# Patient Record
Sex: FEMALE | Race: WHITE | NOT HISPANIC OR LATINO | URBAN - METROPOLITAN AREA
[De-identification: names, ages, dates, MRNs, and addresses within clinical notes are randomized per-mention and may not be internally consistent; named-entity substitution may affect disease eponyms.]

---

## 2019-03-01 LAB
EXTERNAL HIV SCREEN: NORMAL
HCV AB SER-ACNC: NEGATIVE

## 2021-04-08 ENCOUNTER — OFFICE VISIT (OUTPATIENT)
Dept: URGENT CARE | Facility: CLINIC | Age: 26
End: 2021-04-08

## 2021-04-08 VITALS
HEART RATE: 72 BPM | WEIGHT: 155 LBS | DIASTOLIC BLOOD PRESSURE: 58 MMHG | BODY MASS INDEX: 26.46 KG/M2 | RESPIRATION RATE: 18 BRPM | OXYGEN SATURATION: 98 % | HEIGHT: 64 IN | TEMPERATURE: 97.6 F | SYSTOLIC BLOOD PRESSURE: 112 MMHG

## 2021-04-08 DIAGNOSIS — L02.91 ABSCESS: Primary | ICD-10-CM

## 2021-04-08 DIAGNOSIS — L03.213 PERIORBITAL CELLULITIS OF RIGHT EYE: ICD-10-CM

## 2021-04-08 PROCEDURE — G0382 LEV 3 HOSP TYPE B ED VISIT: HCPCS | Performed by: NURSE PRACTITIONER

## 2021-04-08 RX ORDER — SULFAMETHOXAZOLE AND TRIMETHOPRIM 800; 160 MG/1; MG/1
1 TABLET ORAL EVERY 12 HOURS SCHEDULED
Qty: 14 TABLET | Refills: 0 | Status: SHIPPED | OUTPATIENT
Start: 2021-04-08 | End: 2021-04-15

## 2021-04-08 NOTE — PATIENT INSTRUCTIONS
Abscess   AMBULATORY CARE:   An abscess  is an area under the skin where pus (infected fluid) collects  An abscess is often caused by bacteria, fungi or other germs that get into an open wound  You can get an abscess anywhere on your body  Common signs and symptoms of an abscess: You may have a swollen mass that is red and painful  Pus may leak out of the mass  The pus will be white or yellow and may smell bad  You may have redness and pain days before the mass appears  You may have a fever and chills if the infection spreads  Seek immediate care if:   · The area around your abscess becomes very painful, warm, or has red streaks  · You have a fever and chills  · Your heart is beating faster than usual      · You feel faint or confused  Contact your healthcare provider if:   · Your abscess gets bigger or does not get better  · Your abscess returns  · You have questions or concerns about your condition or care  Treatment for an abscess: Your healthcare provider may need to make a cut in the abscess to allow the pus to drain  You may need surgery to remove your abscess  You may  need any of the following:  · Antibiotics  help treat a bacterial infection  · Acetaminophen  decreases pain and fever  It is available without a doctor's order  Ask how much to take and how often to take it  Follow directions  Acetaminophen can cause liver damage if not taken correctly  · NSAIDs , such as ibuprofen, help decrease swelling, pain, and fever  This medicine is available with or without a doctor's order  NSAIDs can cause stomach bleeding or kidney problems in certain people  If you take blood thinner medicine, always ask your healthcare provider if NSAIDs are safe for you  Always read the medicine label and follow directions  · Take your medicine as directed  Contact your healthcare provider if you think your medicine is not helping or if you have side effects   Tell him or her if you are allergic to any medicine  Keep a list of the medicines, vitamins, and herbs you take  Include the amounts, and when and why you take them  Bring the list or the pill bottles to follow-up visits  Carry your medicine list with you in case of an emergency  Self-care:   · Apply a warm compress to your abscess  This will help it open and drain  Wet a washcloth in warm, but not hot, water  Apply the compress for 10 minutes  Repeat this 4 times each day  Do not  press on an abscess or try to open it with a needle  You may push the bacteria deeper or into your blood  · Do not share your clothes, towels, or sheets with anyone  This can spread the infection to others  · Wash your hands often  This can help prevent the spread of germs  Use soap and water or an alcohol-based hand rub  Care for your wound after it is drained:   · Care for your wound as directed  If your healthcare provider says it is okay, carefully remove the bandage and gauze packing  You may need to soak the gauze to get it out of your wound  Clean your wound and the area around it as directed  Dry the area and put on new, clean bandages  Change your bandages when they get wet or dirty  · Ask your healthcare provider how to change the gauze in your wound  Keep track of how many pieces of gauze are placed inside the wound  Do not put too much packing in the wound  Do not pack the gauze too tightly in your wound  Follow up with your healthcare provider in 1 to 3 days: You may need to have your packing removed or your bandage changed  Write down your questions so you remember to ask them during your visits  © Copyright [address] Information is for End User's use only and may not be sold, redistributed or otherwise used for commercial purposes  All illustrations and images included in CareNotes® are the copyrighted property of [employer]  or [facility] Dillan Parker   The above information is an  only   It is not intended as medical advice for individual conditions or treatments  Talk to your doctor, nurse or pharmacist before following any medical regimen to see if it is safe and effective for you

## 2021-04-08 NOTE — PROGRESS NOTES
[facility]        NAME: Sophie Marcum is a 31 y o  female  : 1995    MRN: [id number]  DATE: 2021  TIME: 6:15 PM    Assessment and Plan   Abscess [L02 91]  1  Abscess  sulfamethoxazole-trimethoprim (BACTRIM DS) 800-160 mg per tablet   2  Periorbital cellulitis of right eye  sulfamethoxazole-trimethoprim (BACTRIM DS) 800-160 mg per tablet     Start course of bactrim   rec warm compresses  Keep area clean and dry   rec topical antibiotic ointment also  Patient Instructions     Follow up with PCP in 3-5 days  Proceed to  ER if symptoms worsen  Chief Complaint     Chief Complaint   Patient presents with    Facial Pain     Pt reports having a pimple on her right forehead that she popped a few days ago  Pt states today she woke up with swelling and pressure around the lesion radiating through to her nasal area  History of Present Illness   Sophie Marcum presents to the clinic c/o    Pt reports having a pimple on her right forehead that she popped a few days ago  Pt states today she woke up with swelling and pressure around the lesion radiating through to her nasal area  Review of Systems   Review of Systems   All other systems reviewed and are negative  Current Medications     No long-term medications on file  Current Allergies     Allergies as of 2021    (No Known Allergies)            The following portions of the patient's history were reviewed and updated as appropriate: allergies, current medications, past family history, past medical history, past social history, past surgical history and problem list     Objective   /58   Pulse 72   Temp 97 6 °F (36 4 °C) (Tympanic)   Resp 18   Ht 5' 4" (1 626 m)   Wt 70 3 kg (155 lb)   LMP 2021 (Approximate)   SpO2 98%   BMI 26 61 kg/m²        Physical Exam     Physical Exam  Vitals signs and nursing note reviewed  Constitutional:       Appearance: She is well-developed     HENT:      Head: Normocephalic and atraumatic  Right periorbital erythema present  No raccoon eyes, Rhodes's sign, abrasion, contusion, masses, left periorbital erythema or laceration  Hair is normal      Eyes:      General: Lids are normal       Conjunctiva/sclera: Conjunctivae normal    Cardiovascular:      Rate and Rhythm: Normal rate and regular rhythm  Heart sounds: Normal heart sounds, S1 normal and S2 normal    Pulmonary:      Effort: Pulmonary effort is normal       Breath sounds: Normal breath sounds  Skin:     General: Skin is warm and dry  Neurological:      Mental Status: She is alert and oriented to person, place, and time  Psychiatric:         Speech: Speech normal          Behavior: Behavior normal  Behavior is cooperative  Thought Content:  Thought content normal          Judgment: Judgment normal

## 2022-07-20 ENCOUNTER — OFFICE VISIT (OUTPATIENT)
Dept: OBGYN CLINIC | Facility: CLINIC | Age: 27
End: 2022-07-20
Payer: COMMERCIAL

## 2022-07-20 VITALS
HEIGHT: 64 IN | SYSTOLIC BLOOD PRESSURE: 116 MMHG | BODY MASS INDEX: 27.45 KG/M2 | DIASTOLIC BLOOD PRESSURE: 76 MMHG | WEIGHT: 160.8 LBS

## 2022-07-20 DIAGNOSIS — Z72.53 HIGH RISK BISEXUAL BEHAVIOR: ICD-10-CM

## 2022-07-20 DIAGNOSIS — R10.2 PELVIC PAIN: Primary | ICD-10-CM

## 2022-07-20 PROBLEM — F31.9 BIPOLAR DISORDER (HCC): Status: ACTIVE | Noted: 2021-09-03

## 2022-07-20 PROBLEM — F10.20 ALCOHOL USE DISORDER, SEVERE, DEPENDENCE (HCC): Status: ACTIVE | Noted: 2021-09-03

## 2022-07-20 PROBLEM — F32.9 MAJOR DEPRESSIVE DISORDER: Status: ACTIVE | Noted: 2021-09-04

## 2022-07-20 PROCEDURE — 87591 N.GONORRHOEAE DNA AMP PROB: CPT | Performed by: OBSTETRICS & GYNECOLOGY

## 2022-07-20 PROCEDURE — 87660 TRICHOMONAS VAGIN DIR PROBE: CPT | Performed by: OBSTETRICS & GYNECOLOGY

## 2022-07-20 PROCEDURE — 87510 GARDNER VAG DNA DIR PROBE: CPT | Performed by: OBSTETRICS & GYNECOLOGY

## 2022-07-20 PROCEDURE — 99203 OFFICE O/P NEW LOW 30 MIN: CPT | Performed by: OBSTETRICS & GYNECOLOGY

## 2022-07-20 PROCEDURE — 87491 CHLMYD TRACH DNA AMP PROBE: CPT | Performed by: OBSTETRICS & GYNECOLOGY

## 2022-07-20 PROCEDURE — 87480 CANDIDA DNA DIR PROBE: CPT | Performed by: OBSTETRICS & GYNECOLOGY

## 2022-07-20 NOTE — PROGRESS NOTES
Patient is a 28 y o  No obstetric history on file  with Patient's last menstrual period was 07/12/2022 (exact date)  who presents requesting evaluation of a chronic issue she has never had evaluated  She reports she was last seen by a gynecologist greater than 10 years ago  She is unsure if her problem is GI or  in nature  Pt reports that she intermittently develops lower abdominal pain that radiates to her rectum and it "feels like I am sitting on knives"  She reports when it occurs it lasts for hours to days and she misses work  Pt reports that it occurred last week and lasted for 3 days  She missed 3 days of work as a result  She did not go to the hospital and her mother gave her percocet to manage the pain  She reports there is no pattern to it and she cannot predict when it is going to happen  She reports it is not associated with constipation  However, this last episode occurred after pushing in the bathroom with a bowel movement, but then she got her menses the next day  She reports that it does not happen with each menses and sometimes occurs after menses  Additionally she report occasional rectal bleeding  She has tried NSAIDS without any relief  Additionally she has tried heat with minimal improvement  She reports her cycles are regular and they have not changed in nature  She denies any n/v  She denies abnormal vaginal discharge or odor  She does have a history of high risk sexual behavior and has not had recent STD testing  Open to having this performed today  She does not use contraception or condoms for STD protection  Pt advised to keep a log of what she has eaten, whether or not she has been sexually active or what she is doing when pain starts so we can try to tease out if GI or  in nature  Pt agreeable       Past Medical History:   Diagnosis Date    Calculus of kidney 04/16/2015    HPV vaccine counseling     Received 2 injections       Past Surgical History:   Procedure Laterality Date    NO PAST SURGERIES         OB History    Para Term  AB Living   0 0 0 0 0 0   SAB IAB Ectopic Multiple Live Births   0 0 0 0 0   Obstetric Comments   Menarche: 11      Menses: 28/6-7/super plus tampon every 3 hours for 3 days and then super tampon and then regular           No current outpatient medications on file  No Known Allergies    Social History     Socioeconomic History    Marital status: Single     Spouse name: None    Number of children: 0    Years of education: None    Highest education level: High school graduate   Occupational History    Occupation:    Tobacco Use    Smoking status: Current Every Day Smoker     Packs/day: 0 50     Types: Cigarettes    Smokeless tobacco: Never Used   Vaping Use    Vaping Use: Former   Substance and Sexual Activity    Alcohol use:  Yes     Alcohol/week: 12 0 - 18 0 standard drinks     Types: 12 - 18 Standard drinks or equivalent per week     Comment: 4-6 servings, 3x/week    Drug use: Yes     Types: Marijuana     Comment: medical; in the past patient has used amphetamines, benzos; MDMA, Methamphetamines, Heroin    Sexual activity: Yes     Partners: Male, Female     Birth control/protection: None     Comment: Lifetime partners: 80; Current partner is male,    Other Topics Concern    None   Social History Narrative    Gnosticist: Agnostic    Accepts blood products     Social Determinants of Health     Financial Resource Strain: Not on file   Food Insecurity: Not on file   Transportation Needs: Not on file   Physical Activity: Not on file   Stress: Not on file   Social Connections: Not on file   Intimate Partner Violence: Not on file   Housing Stability: Not on file       Family History   Problem Relation Age of Onset    Other Mother         "autoimmune disease"; also had hysterectomy    Diabetes type II Mother     Stroke Father 61    Hypertension Father     No Known Problems Sister     No Known Problems Brother  Fibroids Maternal Grandmother         required hysterectomy    Cataracts Maternal Grandfather     Glaucoma Maternal Grandfather     Diabetes type II Paternal Grandmother     No Known Problems Paternal Grandfather     Breast cancer Neg Hx     Ovarian cancer Neg Hx     Colon cancer Neg Hx        Review of Systems   Constitutional: Negative for chills, fatigue, fever and unexpected weight change  HENT: Negative for congestion, mouth sores and sore throat  Respiratory: Negative for cough, chest tightness, shortness of breath and wheezing  Cardiovascular: Negative for chest pain and palpitations  Gastrointestinal: Positive for abdominal pain and constipation (intermittent)  Negative for abdominal distention, diarrhea, nausea and vomiting  Endocrine: Negative for cold intolerance and heat intolerance  Genitourinary: Positive for dyspareunia (sometimes) and pelvic pain  Negative for dysuria, genital sores, menstrual problem, vaginal bleeding, vaginal discharge and vaginal pain  Musculoskeletal: Negative for arthralgias  Skin: Negative for color change and rash  Neurological: Negative for dizziness, light-headedness and headaches  Hematological: Negative for adenopathy  Blood pressure 116/76, height 5' 4" (1 626 m), weight 72 9 kg (160 lb 12 8 oz), last menstrual period 07/12/2022  and Body mass index is 27 6 kg/m²  Physical Exam  Constitutional:       General: She is not in acute distress  Appearance: She is well-developed  She is not ill-appearing or toxic-appearing  HENT:      Head: Normocephalic and atraumatic  Eyes:      Extraocular Movements: Extraocular movements intact  Conjunctiva/sclera: Conjunctivae normal    Cardiovascular:      Rate and Rhythm: Normal rate and regular rhythm  Heart sounds: Normal heart sounds  Pulmonary:      Effort: Pulmonary effort is normal       Breath sounds: Normal breath sounds  Abdominal:      General: Abdomen is flat  Bowel sounds are normal  There is no distension  Palpations: Abdomen is soft  There is no mass  Tenderness: There is abdominal tenderness (pan tender)  There is no guarding or rebound  Hernia: No hernia is present  Musculoskeletal:         General: Normal range of motion  Cervical back: Normal range of motion  Skin:     General: Skin is warm  Findings: No erythema or rash  Neurological:      Mental Status: She is alert and oriented to person, place, and time  Psychiatric:         Mood and Affect: Mood normal          Behavior: Behavior normal          Thought Content: Thought content normal          Judgment: Judgment normal           vulva: normal external genitalia for age and no lesions, masses, epithelial changes, or exudate  vagina: color pink, rugae  well formed rugae and discharge  white  cervix: nullip, no lesions  and no cervical motion tenderness  uterus: NSSC, AF, NT, mobile  adnexa: no masses, generalized tenderness in lower abdomen/pelvic region      A/P:  Pt is a 28 y o  No obstetric history on file  with      Keli Ribeiro was seen today for abdominal pain  Diagnoses and all orders for this visit:    Pelvic pain  -     US pelvis complete w transvaginal; Future  -     Chlamydia/GC amplified DNA by PCR  -     VAGINOSIS DNA PROBE (AFFIRM)    High risk bisexual behavior  -     Chlamydia/GC amplified DNA by PCR  -     VAGINOSIS DNA PROBE (AFFIRM)    Pt will schedule appointment with GI, keep symptom diary and have pelvic u/s  Will call with results  F/u in 3-4 months

## 2022-07-21 LAB
C TRACH DNA SPEC QL NAA+PROBE: NEGATIVE
N GONORRHOEA DNA SPEC QL NAA+PROBE: NEGATIVE

## 2022-07-22 DIAGNOSIS — N76.0 BACTERIAL VAGINITIS: Primary | ICD-10-CM

## 2022-07-22 DIAGNOSIS — B96.89 BACTERIAL VAGINITIS: Primary | ICD-10-CM

## 2022-07-22 LAB
CANDIDA RRNA VAG QL PROBE: NEGATIVE
G VAGINALIS RRNA GENITAL QL PROBE: POSITIVE
T VAGINALIS RRNA GENITAL QL PROBE: NEGATIVE

## 2022-07-22 RX ORDER — METRONIDAZOLE 500 MG/1
500 TABLET ORAL EVERY 12 HOURS SCHEDULED
Qty: 14 TABLET | Refills: 0 | Status: SHIPPED | OUTPATIENT
Start: 2022-07-22 | End: 2022-07-29

## 2022-09-12 ENCOUNTER — HOSPITAL ENCOUNTER (EMERGENCY)
Facility: HOSPITAL | Age: 27
Discharge: HOME | End: 2022-09-12
Attending: EMERGENCY MEDICINE
Payer: COMMERCIAL

## 2022-09-12 VITALS
BODY MASS INDEX: 28.04 KG/M2 | OXYGEN SATURATION: 100 % | HEART RATE: 73 BPM | RESPIRATION RATE: 18 BRPM | WEIGHT: 163.36 LBS | TEMPERATURE: 97.9 F | DIASTOLIC BLOOD PRESSURE: 78 MMHG | SYSTOLIC BLOOD PRESSURE: 153 MMHG

## 2022-09-12 DIAGNOSIS — R10.13 EPIGASTRIC ABDOMINAL PAIN: Primary | ICD-10-CM

## 2022-09-12 LAB
ALBUMIN SERPL BCP-MCNC: 3.8 G/DL (ref 3.5–5)
ALP SERPL-CCNC: 43 U/L (ref 46–116)
ALT SERPL W P-5'-P-CCNC: 17 U/L (ref 12–78)
ANION GAP SERPL CALCULATED.3IONS-SCNC: 9 MMOL/L (ref 4–13)
AST SERPL W P-5'-P-CCNC: 14 U/L (ref 5–45)
BASOPHILS # BLD AUTO: 0.03 THOUSANDS/ΜL (ref 0–0.1)
BASOPHILS NFR BLD AUTO: 0 % (ref 0–1)
BILIRUB SERPL-MCNC: 0.98 MG/DL (ref 0.2–1)
BUN SERPL-MCNC: 15 MG/DL (ref 5–25)
CALCIUM SERPL-MCNC: 9.5 MG/DL (ref 8.3–10.1)
CHLORIDE SERPL-SCNC: 101 MMOL/L (ref 96–108)
CO2 SERPL-SCNC: 28 MMOL/L (ref 21–32)
CREAT SERPL-MCNC: 0.7 MG/DL (ref 0.6–1.3)
EOSINOPHIL # BLD AUTO: 0.15 THOUSAND/ΜL (ref 0–0.61)
EOSINOPHIL NFR BLD AUTO: 2 % (ref 0–6)
ERYTHROCYTE [DISTWIDTH] IN BLOOD BY AUTOMATED COUNT: 12.7 % (ref 11.6–15.1)
GFR SERPL CREATININE-BSD FRML MDRD: 118 ML/MIN/1.73SQ M
GLUCOSE SERPL-MCNC: 84 MG/DL (ref 65–140)
HCT VFR BLD AUTO: 41.1 % (ref 34.8–46.1)
HGB BLD-MCNC: 13.7 G/DL (ref 11.5–15.4)
IMM GRANULOCYTES # BLD AUTO: 0.02 THOUSAND/UL (ref 0–0.2)
IMM GRANULOCYTES NFR BLD AUTO: 0 % (ref 0–2)
LIPASE SERPL-CCNC: 73 U/L (ref 73–393)
LYMPHOCYTES # BLD AUTO: 2.24 THOUSANDS/ΜL (ref 0.6–4.47)
LYMPHOCYTES NFR BLD AUTO: 31 % (ref 14–44)
MCH RBC QN AUTO: 30.4 PG (ref 26.8–34.3)
MCHC RBC AUTO-ENTMCNC: 33.3 G/DL (ref 31.4–37.4)
MCV RBC AUTO: 91 FL (ref 82–98)
MONOCYTES # BLD AUTO: 0.68 THOUSAND/ΜL (ref 0.17–1.22)
MONOCYTES NFR BLD AUTO: 9 % (ref 4–12)
NEUTROPHILS # BLD AUTO: 4.2 THOUSANDS/ΜL (ref 1.85–7.62)
NEUTS SEG NFR BLD AUTO: 58 % (ref 43–75)
NRBC BLD AUTO-RTO: 0 /100 WBCS
PLATELET # BLD AUTO: 263 THOUSANDS/UL (ref 149–390)
PMV BLD AUTO: 10.6 FL (ref 8.9–12.7)
POTASSIUM SERPL-SCNC: 4.2 MMOL/L (ref 3.5–5.3)
PROT SERPL-MCNC: 7.7 G/DL (ref 6.4–8.4)
RBC # BLD AUTO: 4.51 MILLION/UL (ref 3.81–5.12)
SODIUM SERPL-SCNC: 138 MMOL/L (ref 135–147)
WBC # BLD AUTO: 7.32 THOUSAND/UL (ref 4.31–10.16)

## 2022-09-12 PROCEDURE — 99284 EMERGENCY DEPT VISIT MOD MDM: CPT | Performed by: EMERGENCY MEDICINE

## 2022-09-12 PROCEDURE — 99284 EMERGENCY DEPT VISIT MOD MDM: CPT

## 2022-09-12 PROCEDURE — 83690 ASSAY OF LIPASE: CPT | Performed by: EMERGENCY MEDICINE

## 2022-09-12 PROCEDURE — 96374 THER/PROPH/DIAG INJ IV PUSH: CPT

## 2022-09-12 PROCEDURE — 96361 HYDRATE IV INFUSION ADD-ON: CPT

## 2022-09-12 PROCEDURE — 85025 COMPLETE CBC W/AUTO DIFF WBC: CPT | Performed by: EMERGENCY MEDICINE

## 2022-09-12 PROCEDURE — 80053 COMPREHEN METABOLIC PANEL: CPT | Performed by: EMERGENCY MEDICINE

## 2022-09-12 PROCEDURE — 36415 COLL VENOUS BLD VENIPUNCTURE: CPT | Performed by: EMERGENCY MEDICINE

## 2022-09-12 RX ORDER — FAMOTIDINE 10 MG/ML
20 INJECTION, SOLUTION INTRAVENOUS ONCE
Status: COMPLETED | OUTPATIENT
Start: 2022-09-12 | End: 2022-09-12

## 2022-09-12 RX ORDER — KETOROLAC TROMETHAMINE 30 MG/ML
15 INJECTION, SOLUTION INTRAMUSCULAR; INTRAVENOUS ONCE
Status: DISCONTINUED | OUTPATIENT
Start: 2022-09-12 | End: 2022-09-12

## 2022-09-12 RX ORDER — FAMOTIDINE 20 MG/1
20 TABLET, FILM COATED ORAL 2 TIMES DAILY
Qty: 20 TABLET | Refills: 0 | Status: SHIPPED | OUTPATIENT
Start: 2022-09-12

## 2022-09-12 RX ADMIN — SODIUM CHLORIDE 1000 ML: 0.9 INJECTION, SOLUTION INTRAVENOUS at 16:32

## 2022-09-12 RX ADMIN — FAMOTIDINE 20 MG: 10 INJECTION INTRAVENOUS at 16:30

## 2022-09-12 NOTE — Clinical Note
Denise Arredondo was seen and treated in our emergency department on 9/12/2022  Diagnosis:     Diana Ahumada  may return to work on return date  She may return on this date: 09/15/2022         If you have any questions or concerns, please don't hesitate to call        Dwayne    ______________________________           _______________          _______________  Hospital Representative                              Date                                Time

## 2022-09-12 NOTE — ED PROVIDER NOTES
History  Chief Complaint   Patient presents with    Abdominal Pain     Pt reports upper abd pain worsens with certain foods  +N/v "feels like I have a stomach ulcer gnawing away at me"     A 69-year-old female with no significant past medical history; presents with epigastric abdominal pain that has gotten progressively worse for the past 2 days  Pain does radiate towards her back  Pain is described as a gnawing sensation  Pain is made worse with eating and movement  Patient has had intermittent episodes of nausea and vomiting, usually occurring after attempting to eat  Patient has otherwise not had fever, chills, chest pain, shortness of breath, diarrhea, peripheral edema and rashes  Patient has never had similar symptoms  Patient does report a history of alcohol use, last use was Saturday after onset of the pain  A/P:  Epigastric abdominal pain, reproducible tenderness  Concern for gastritis/PUD, pancreatitis, hepatitis and biliary etiology  Will check lab work and imaging cortically  Will treat symptomatically        History provided by:  Patient and medical records  Abdominal Pain  Associated symptoms: nausea and vomiting        None       Past Medical History:   Diagnosis Date    Calculus of kidney 04/16/2015    HPV vaccine counseling     Received 2 injections       Past Surgical History:   Procedure Laterality Date    NO PAST SURGERIES         Family History   Problem Relation Age of Onset    Other Mother         "autoimmune disease"; also had hysterectomy    Diabetes type II Mother     Stroke Father 61    Hypertension Father     No Known Problems Sister     No Known Problems Brother     Fibroids Maternal Grandmother         required hysterectomy    Cataracts Maternal Grandfather     Glaucoma Maternal Grandfather     Diabetes type II Paternal Grandmother     No Known Problems Paternal Grandfather     Breast cancer Neg Hx     Ovarian cancer Neg Hx     Colon cancer Neg Hx      I have reviewed and agree with the history as documented  E-Cigarette/Vaping    E-Cigarette Use Former User     Comments tried a few      E-Cigarette/Vaping Substances    Nicotine No     THC No     CBD No     Flavoring No     Other No     Unknown No      Social History     Tobacco Use    Smoking status: Current Every Day Smoker     Packs/day: 0 50     Types: Cigarettes    Smokeless tobacco: Never Used   Vaping Use    Vaping Use: Former   Substance Use Topics    Alcohol use: Yes     Alcohol/week: 12 0 - 18 0 standard drinks     Types: 12 - 18 Standard drinks or equivalent per week     Comment: 4-6 servings, 3x/week    Drug use: Yes     Types: Marijuana     Comment: medical; in the past patient has used amphetamines, benzos; MDMA, Methamphetamines, Heroin       Review of Systems   Gastrointestinal: Positive for abdominal pain, nausea and vomiting  All other systems reviewed and are negative  Physical Exam  Physical Exam  General Appearance: alert and oriented, nad, non toxic appearing  Skin:  Warm, dry, intact  No cyanosis  HEENT: Atraumatic, normocephalic  No eye drainage  Normal hearing  Moist mucous membranes  Neck: Supple, trachea midline  Cardiac: RRR; no murmurs, rub, gallops  No pedal edema, 2+ pulses  Pulmonary: lungs CTAB; no wheezes, rales, rhonchi  Gastrointestinal: abdomen soft  Upper abdominal tenderness, greatest in epigastric region  Nondistended; no guarding or rebound tenderness; good bowel sounds, no mass or bruits  Extremities:  No deformities    No calf tenderness, no clubbing  Neuro:  no focal motor or sensory deficits, CN 2-12 grossly intact  Psych:  Normal mood and affect, normal judgement and insight      Vital Signs  ED Triage Vitals [09/12/22 1556]   Temperature Pulse Respirations Blood Pressure SpO2   97 9 °F (36 6 °C) 73 18 153/78 100 %      Temp src Heart Rate Source Patient Position - Orthostatic VS BP Location FiO2 (%)   -- Monitor Sitting Right arm --      Pain Score       5           Vitals:    09/12/22 1556   BP: 153/78   Pulse: 73   Patient Position - Orthostatic VS: Sitting         Visual Acuity      ED Medications  Medications   sodium chloride 0 9 % bolus 1,000 mL (0 mL Intravenous Stopped 9/12/22 1732)   Famotidine (PF) (PEPCID) injection 20 mg (20 mg Intravenous Given 9/12/22 1630)       Diagnostic Studies  Results Reviewed     Procedure Component Value Units Date/Time    Comprehensive metabolic panel [[id number]]  (Abnormal) Collected: 09/12/22 1629    Lab Status: Final result Specimen: Blood from Arm, Right Updated: 09/12/22 1700     Sodium 138 mmol/L      Potassium 4 2 mmol/L      Chloride 101 mmol/L      CO2 28 mmol/L      ANION GAP 9 mmol/L      BUN 15 mg/dL      Creatinine 0 70 mg/dL      Glucose 84 mg/dL      Calcium 9 5 mg/dL      AST 14 U/L      ALT 17 U/L      Alkaline Phosphatase 43 U/L      Total Protein 7 7 g/dL      Albumin 3 8 g/dL      Total Bilirubin 0 98 mg/dL      eGFR 118 ml/min/1 73sq m     Narrative:      [place] guidelines for Chronic Kidney Disease (CKD):     Stage 1 with normal or high GFR (GFR > 90 mL/min/1 73 square meters)    Stage 2 Mild CKD (GFR = 60-89 mL/min/1 73 square meters)    Stage 3A Moderate CKD (GFR = 45-59 mL/min/1 73 square meters)    Stage 3B Moderate CKD (GFR = 30-44 mL/min/1 73 square meters)    Stage 4 Severe CKD (GFR = 15-29 mL/min/1 73 square meters)    Stage 5 End Stage CKD (GFR <15 mL/min/1 73 square meters)  Note: GFR calculation is accurate only with a steady state creatinine    Lipase [[id number]]  (Normal) Collected: 09/12/22 1629    Lab Status: Final result Specimen: Blood from Arm, Right Updated: 09/12/22 1700     Lipase 73 u/L     CBC and differential [[id number]] Collected: 09/12/22 1629    Lab Status: Final result Specimen: Blood from Arm, Right Updated: 09/12/22 1642     WBC 7 32 Thousand/uL      RBC 4 51 Million/uL      Hemoglobin 13 7 g/dL      Hematocrit 41 1 %      MCV 91 fL      MCH 30 4 pg      MCHC 33 3 g/dL      RDW 12 7 %      MPV 10 6 fL      Platelets 837 Thousands/uL      nRBC 0 /100 WBCs      Neutrophils Relative 58 %      Immat GRANS % 0 %      Lymphocytes Relative 31 %      Monocytes Relative 9 %      Eosinophils Relative 2 %      Basophils Relative 0 %      Neutrophils Absolute 4 20 Thousands/µL      Immature Grans Absolute 0 02 Thousand/uL      Lymphocytes Absolute 2 24 Thousands/µL      Monocytes Absolute 0 68 Thousand/µL      Eosinophils Absolute 0 15 Thousand/µL      Basophils Absolute 0 03 Thousands/µL                  No orders to display              Procedures  Procedures         ED Course  ED Course as of 09/12/22 2210   Mon Sep 12, 2022   Enriqueta 68 Pt updated on lab results, reports some improvement in her pain following Pepcid  Pt had not yet received toradol  Symptoms likely due to gastritis vs early PUD  Recommend pepcid BID and alcohol cessation, pt in agreement  SBIRT 22yo+    Flowsheet Row Most Recent Value   SBIRT (23 yo +)    In order to provide better care to our patients, we are screening all of our patients for alcohol and drug use  Would it be okay to ask you these screening questions? No Filed at: 09/12/2022 1633                    MDM    Disposition  Final diagnoses:   Epigastric abdominal pain     Time reflects when diagnosis was documented in both MDM as applicable and the Disposition within this note     Time User Action Codes Description Comment    9/12/2022  5:39 PM Stephanie Key Add [R10 13] Epigastric abdominal pain       ED Disposition     ED Disposition   Discharge    Condition   Stable    Date/Time   Mon Sep 12, 2022  5:39 PM    Comment   Chery Sadler discharge to home/self care                 Follow-up Information     Follow up With Specialties Details Why Contact Info Additional [address] [facility] Schedule an appointment as soon as possible for a visit  To establish care with a family physician [address], [address] [facility] [place] [address]  [address], [address], [address] [place], [place], [address] [place] [address] [facility] Schedule an appointment as soon as possible for a visit  If symptoms worsen [address]  [address] [facility] [address]  Sherman Ledesma [address] [facility] [place], [address], [address] [place], [place], [address] [phone]          Discharge Medication List as of 9/12/2022  5:42 PM      START taking these medications    Details   famotidine (PEPCID) 20 mg tablet Take 1 tablet (20 mg total) by mouth 2 (two) times a day, Starting Mon 9/12/2022, Print             No discharge procedures on file      PDMP Review     None          ED Provider  Electronically Signed by           Mika Santana DO  09/12/22 0129

## 2022-09-12 NOTE — DISCHARGE INSTRUCTIONS
Start taking pepcid twice a day, this will decrease acid production and inflammation within your stomach ultimately decreasing pain  It may take a few days to reach maximal effect  Limit alcohol use, greasy/fatty/spicey foods and caffeine as these can worsen your symptoms  If symptoms persist, follow up with gastroenterology (stomach doctors) for further evaluation

## 2023-02-04 ENCOUNTER — APPOINTMENT (EMERGENCY)
Dept: RADIOLOGY | Facility: HOSPITAL | Age: 28
End: 2023-02-04

## 2023-02-04 ENCOUNTER — HOSPITAL ENCOUNTER (EMERGENCY)
Facility: HOSPITAL | Age: 28
Discharge: HOME | End: 2023-02-04
Attending: EMERGENCY MEDICINE

## 2023-02-04 VITALS
DIASTOLIC BLOOD PRESSURE: 82 MMHG | TEMPERATURE: 98.3 F | OXYGEN SATURATION: 99 % | HEART RATE: 81 BPM | SYSTOLIC BLOOD PRESSURE: 127 MMHG | RESPIRATION RATE: 21 BRPM
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
## 2023-02-04 DIAGNOSIS — S62.609A FINGER FRACTURE: ICD-10-CM

## 2023-02-04 RX ORDER — AMOXICILLIN AND CLAVULANATE POTASSIUM 875; 125 MG/1; MG/1
1 TABLET, FILM COATED ORAL EVERY 12 HOURS
Qty: 14 TABLET | Refills: 0 | Status: SHIPPED | OUTPATIENT
Start: 2023-02-04 | End: 2023-02-11

## 2023-02-04 RX ORDER — AMOXICILLIN AND CLAVULANATE POTASSIUM 875; 125 MG/1; MG/1
1 TABLET, FILM COATED ORAL ONCE
Status: COMPLETED | OUTPATIENT
Start: 2023-02-04 | End: 2023-02-04

## 2023-02-04 RX ORDER — IBUPROFEN 600 MG/1
600 TABLET ORAL ONCE
Status: COMPLETED | OUTPATIENT
Start: 2023-02-04 | End: 2023-02-04

## 2023-02-04 RX ORDER — ACETAMINOPHEN 325 MG/1
975 TABLET ORAL ONCE
Status: COMPLETED | OUTPATIENT
Start: 2023-02-04 | End: 2023-02-04

## 2023-02-04 RX ORDER — GINSENG 100 MG
1 CAPSULE ORAL ONCE
Status: COMPLETED | OUTPATIENT
Start: 2023-02-04 | End: 2023-02-04

## 2023-02-04 RX ADMIN — TETANUS TOXOID, REDUCED DIPHTHERIA TOXOID AND ACELLULAR PERTUSSIS VACCINE, ADSORBED 0.5 ML: 5; 2.5; 8; 8; 2.5 SUSPENSION INTRAMUSCULAR at 04:32

## 2023-02-04 RX ADMIN — AMOXICILLIN AND CLAVULANATE POTASSIUM 1 TABLET: 875; 125 TABLET, FILM COATED ORAL at 04:36

## 2023-02-04 RX ADMIN — RABIES IMMUNE GLOBULIN (HUMAN) 1470 UNITS: 300 INJECTION, SOLUTION INFILTRATION; INTRAMUSCULAR at 04:39

## 2023-02-04 RX ADMIN — BACITRACIN ZINC 1 SMALL APPLICATION: 500 OINTMENT TOPICAL at 04:37

## 2023-02-04 RX ADMIN — Medication 1 ML: at 04:31

## 2023-02-04 RX ADMIN — IBUPROFEN 600 MG: 600 TABLET, FILM COATED ORAL at 04:36

## 2023-02-04 RX ADMIN — ACETAMINOPHEN 975 MG: 325 TABLET ORAL at 04:36

## 2023-02-04 NOTE — Clinical Note
Anisha Farrell was seen and treated in our emergency department on 2/4/2023  Diagnosis: dog bite with finger fracture    Margarita Brar  may return to work on return date  She may return on this date: 02/07/2023         If you have any questions or concerns, please don't hesitate to call        Kassie Carr PA-C    ______________________________           _______________          _______________  [facility] Representative                              Date                                Time

## 2023-02-04 NOTE — ED NOTES
Pt reports L 2nd digit pain and L wrist pain  Pt is noted to have bruising and swelling to same  Pt refusing any additional testing  Rebecca aware of same  RN called to pt's mother to come and  pt from hospital, as pt is noted to be tired and pt is expected to drive self  No answer, generic message left        Malissa Espitia, RN  02/04/23 9469

## 2023-02-04 NOTE — ED PROVIDER NOTES
History  Chief Complaint   Patient presents with   • Dog Bite     Pt reports dog bite to R pinky finger  Unknown if dog is UTD on shots  Unknown if she is UTD on tetanus     Ayleen eKnt is a 28 y o   female with no documented past medical history who presents to the emergency department with a dog bite  Patient states approximate 1 hour prior to arrival her to personally owned dogs got into a dog bite  She attempted to break it up  During the process of breaking up a dog fight she incurred an injury to her right pinky finger  He notes about 2 lacerations to the area  Dogs are about middle-age, no concern for loose teeth  She states she is "pretty sure "that the 1 dog is not up-to-date on vaccines including rabies  She is unsure of her last tetanus shot  She notes she has significant pain in the right pinky finger made worse with attempting to move it  She states "I just need some pain medicine so I can go to work "  No other injuries reported or identified  History provided by:  Patient   used: No    Dog Bite  Contact animal:  Dog  Location:  Finger  Finger injury location:  R little finger  Time since incident:  1 hour  Pain details:     Quality:  Aching    Severity:  Mild    Timing:  Constant    Progression:  Unchanged  Incident location:  Home  Provoked: unprovoked    Notifications:  Health department  Animal's rabies vaccination status:  Out of date  Animal in possession: yes    Tetanus status:  Out of date  Relieved by:  None tried  Worsened by: Activity  Ineffective treatments:  None tried  Associated symptoms: no fever, no numbness, no rash and no swelling        Prior to Admission Medications   Prescriptions Last Dose Informant Patient Reported?  Taking?   famotidine (PEPCID) 20 mg tablet   No No   Sig: Take 1 tablet (20 mg total) by mouth 2 (two) times a day      Facility-Administered Medications: None       Past Medical History:   Diagnosis Date   • Calculus of kidney 04/16/2015   • HPV vaccine counseling     Received 2 injections       Past Surgical History:   Procedure Laterality Date   • NO PAST SURGERIES         Family History   Problem Relation Age of Onset   • Other Mother         "autoimmune disease"; also had hysterectomy   • Diabetes type II Mother    • Stroke Father 61   • Hypertension Father    • No Known Problems Sister    • No Known Problems Brother    • Fibroids Maternal Grandmother         required hysterectomy   • Cataracts Maternal Grandfather    • Glaucoma Maternal Grandfather    • Diabetes type II Paternal Grandmother    • No Known Problems Paternal Grandfather    • Breast cancer Neg Hx    • Ovarian cancer Neg Hx    • Colon cancer Neg Hx      I have reviewed and agree with the history as documented  E-Cigarette/Vaping   • E-Cigarette Use Former User    • Comments tried a few      E-Cigarette/Vaping Substances   • Nicotine No    • THC No    • CBD No    • Flavoring No    • Other No    • Unknown No      Social History     Tobacco Use   • Smoking status: Every Day     Packs/day: 0 50     Types: Cigarettes   • Smokeless tobacco: Never   Vaping Use   • Vaping Use: Former   Substance Use Topics   • Alcohol use: Yes     Alcohol/week: 12 0 - 18 0 standard drinks     Types: 12 - 18 Standard drinks or equivalent per week     Comment: 4-6 servings, 3x/week   • Drug use: Yes     Types: Marijuana     Comment: medical; in the past patient has used amphetamines, benzos; MDMA, Methamphetamines, Heroin       Review of Systems   Constitutional: Negative for activity change, appetite change, chills and fever  HENT: Negative for ear pain and sore throat  Eyes: Negative for pain and visual disturbance  Respiratory: Negative for cough and shortness of breath  Cardiovascular: Negative for chest pain and palpitations  Gastrointestinal: Negative for abdominal pain and vomiting  Genitourinary: Negative for dysuria and hematuria     Musculoskeletal: Negative for arthralgias and back pain  Skin: Positive for wound  Negative for color change and rash  Neurological: Negative for seizures, syncope and numbness  All other systems reviewed and are negative  Physical Exam  Physical Exam  Vitals and nursing note reviewed  Constitutional:       General: She is not in acute distress  Appearance: Normal appearance  She is well-developed and normal weight  HENT:      Head: Normocephalic and atraumatic  Nose: Nose normal       Mouth/Throat:      Mouth: Mucous membranes are moist    Eyes:      Conjunctiva/sclera: Conjunctivae normal    Cardiovascular:      Rate and Rhythm: Normal rate and regular rhythm  Heart sounds: No murmur heard  Pulmonary:      Effort: Pulmonary effort is normal  No respiratory distress  Breath sounds: Normal breath sounds  Abdominal:      General: Abdomen is flat  Palpations: Abdomen is soft  Tenderness: There is no abdominal tenderness  Musculoskeletal:         General: Swelling, tenderness and signs of injury present  Hands:       Cervical back: Normal range of motion and neck supple  Comments: Right Upper Extremity:   2+ radial Pulse/ Cap Refill <2 seconds  Shoulder: NTTP, Strength 5/5- FROM including ER/IR/Abduction/Adduction/Flexion/Extension  Elbow: NTTP, Strength 5/5- FROM including Flexion/Extension/ Pronation/supination  Wrist: NTTP, Strength 5/5- FROM including Flexion/Extension/Ulnar Deviation/Radial Deviation  Hand: Diffuse tenderness palpation about the fifth digit: Strength 5/5- FROM at all fingers including flexion, extension, abduction, adduction, and opposition of the thumb  FDS/FDP tendons intact by exam, Extensor tendons intact by exam    Radial/Ulnar/ Median/ and Axillary sensation intact  Skin:     General: Skin is warm and dry  Capillary Refill: Capillary refill takes less than 2 seconds  Neurological:      General: No focal deficit present        Mental Status: She is alert and oriented to person, place, and time  Mental status is at baseline  Vital Signs  ED Triage Vitals   Temperature Pulse Respirations Blood Pressure SpO2   02/04/23 0236 02/04/23 0236 02/04/23 0236 02/04/23 0236 02/04/23 0236   98 3 °F (36 8 °C) 81 21 127/82 99 %      Temp Source Heart Rate Source Patient Position - Orthostatic VS BP Location FiO2 (%)   02/04/23 0236 02/04/23 0236 -- -- --   Oral Monitor         Pain Score       02/04/23 0436       9           Vitals:    02/04/23 0236   BP: 127/82   Pulse: 81         Visual Acuity      ED Medications  Medications   rabies vaccine, human diploid IM injection 1 mL (1 mL Intramuscular Given 2/4/23 0431)   rabies immune globulin, human (HyperRAB) injection 1,470 Units (1,470 Units Infiltration Given 2/4/23 0439)   amoxicillin-clavulanate (AUGMENTIN) 875-125 mg per tablet 1 tablet (1 tablet Oral Given 2/4/23 0436)   tetanus-diphtheria-acellular pertussis (BOOSTRIX) IM injection 0 5 mL (0 5 mL Intramuscular Given 2/4/23 0432)   acetaminophen (TYLENOL) tablet 975 mg (975 mg Oral Given 2/4/23 0436)   ibuprofen (MOTRIN) tablet 600 mg (600 mg Oral Given 2/4/23 0436)   bacitracin topical ointment 1 small application (1 small application Topical Given by Other 2/4/23 0437)       Diagnostic Studies  Results Reviewed     None                 XR finger fifth digit-pinkie RIGHT   ED Interpretation by Magalis Cristina PA-C (02/04 0321)   Abnormal   Minimally displaced middle phalanx fracture of the fifth digit      Final Result by Carisa Chand MD (02/04 1209)      Minimally displaced fracture base of middle phalanx of 5th finger  Workstation performed: [id number]                    Procedures  Procedures         ED Course                               SBIRT 22yo+    Flowsheet Row Most Recent Value   SBIRT (23 yo +)    In order to provide better care to our patients, we are screening all of our patients for alcohol and drug use   Would it be okay to ask you these screening questions? No Filed at: 02/04/2023 0475                    Medical Decision Making  Patient was seen and examined  in the emergency department for chief complaint of dog bite to the right fifth digit  The patient presented 1 hour after dog bite to the right fifth digit  Has pain in the right fifth digit  2 lacerations  Swelling  No numbness weakness paresthesias  Full range of motion  Pain diffusely  Unsure if dogs are up-to-date on vaccine  Unsure last tetanus shot  On exam patient is well-appearing no acute distress  Right fifth digit has 2 lacerations, full range of motion however exacerbates pain  No foreign bodies identified  DDx including but not limited to:  Bite wound, laceration, abrasion, puncture wound, cellulitis, wound infection, abscess, rabies prophylaxis, tetanus prophylaxis; doubt osteomyelitis or necrotizing fasciitis  -Bite wound is present as well as a puncture wound and abrasion  No evidence of cellulitis at this time but will have patient monitor for cellulitis and wound infection  Being that patient does not know if the dogs are fully up-to-date on vaccines will place on rabies prophylaxis with need for repeat vaccinations on 3, 5, 7 and 14  Also obtain x-ray to rule out osseous abnormality and placed on Augmentin prophylactically  Update tetanus  -X-ray does reveal a middle phalanx fracture  We will clean out wounds and placed on Augmentin and recommend hand follow-up soon as possible  We will splint     -Wound was extensively irrigated and soaked  Cleaned and dried  Bacitracin was placed and wrapped in sterile gauze  Placed in a finger static splint  Neurovascular intact pre and post procedure  Aluminum splint was placed by myself  Disposition: General person 59-year-old female who presents with dog bite to the right fifth digit  Fracture was identified  Wounds were clean and dry please place on Augmentin    Close follow-up with hand surgery  Discussed risk of not following up including loss of finger and neurovascular injury  Return precautions discussed  All questions answered  The patient was discharged in stable condition  Patient ambulated off the department  Extensive return to emergency department precautions were discussed  Follow up with appropriate providers including primary care physician was discussed  Patient and/or their  primary decision maker expressed understanding  Patient remained stable during entire emergency department stay  Dog bite, initial encounter: acute illness or injury  Finger fracture: acute illness or injury  Amount and/or Complexity of Data Reviewed  Radiology: ordered and independent interpretation performed  Risk  OTC drugs  Prescription drug management  Disposition  Final diagnoses:   Dog bite, initial encounter   Finger fracture     Time reflects when diagnosis was documented in both MDM as applicable and the Disposition within this note     Time User Action Codes Description Comment    2/4/2023  4:48 AM Kiersten Rife Add Vicky [place]  0XXA] Dog bite, initial encounter     2/4/2023  4:51 AM Kiersten Rife Add [S62 609A] Finger fracture       ED Disposition     ED Disposition   Discharge    Condition   Stable    Date/Time   Sat Feb 4, 2023  4:56 AM    Comment   Yessi Buckner discharge to home/self care                 Follow-up Information     Follow up With Specialties Details Why Contact Info Additional [address] [facility] Emergency Department Emergency Medicine Go to  As needed, If symptoms worsen [facility] [id number]  112 [facility] Emergency Department, [address] [place] [address] 200  Call  To schedule an appointment with a primary care physician [phone]       Gianna Kerr MD Orthopedic Surgery, Hand Surgery Schedule an appointment as soon as possible for a visit   [address]  [phone]             Discharge Medication List as of 2/4/2023  4:56 AM      START taking these medications    Details   amoxicillin-clavulanate (AUGMENTIN) 875-125 mg per tablet Take 1 tablet by mouth every 12 (twelve) hours for 7 days, Starting Sat 2/4/2023, Until Sat 2/11/2023, Normal         CONTINUE these medications which have NOT CHANGED    Details   famotidine (PEPCID) 20 mg tablet Take 1 tablet (20 mg total) by mouth 2 (two) times a day, Starting Mon 9/12/2022, Print                 PDMP Review     None          ED Provider  Electronically Signed by           Lucie Glasgow PA-C  02/04/23 6880

## 2023-02-04 NOTE — DISCHARGE INSTRUCTIONS
You were seen in the emergency department today for Dog bite of the right fifth digit  Radiologic imaging revealed a fracture of the fifth digit  Pam Green Please follow-up with Hand surgery regarding your symptoms  Return sooner to the Emergency Department if increased pain, fever, pus, redness, swelling, red streaks, vomiting, weakness, numbness, bleeding  Elevate  Keep wound dry for 2 days  Wound check in 2 days      -Antibiotics twice daily for 7 days  Keep wound clean and dry  Bacitracin on daily dressing changes  Keep finger immobilized  Follow-up with hand surgery      To complete rabies vaccine series you must return on day 3 (2/7), day 7 (2/11), and day 14 (2/18)

## 2023-02-04 NOTE — ED NOTES
XR had arrived to pt's room, but pt refused XR at that time  RN in to speak with pt  Pt appears frustrated and noted to be using vulgarities  Pt aware that she will be treated with respect and staff expects the same in return  Pt advised of plan of care  Pt now agreeable to XR  XR aware of same and in with pt at this time        Tomasz Hamm, ANA MARIA  02/04/23 2668

## 2023-02-06 ENCOUNTER — TELEPHONE (OUTPATIENT)
Dept: OBGYN CLINIC | Facility: HOSPITAL | Age: 28
End: 2023-02-06

## 2023-02-06 NOTE — TELEPHONE ENCOUNTER
Patient is being referred to a orthopedics  Please schedule accordingly      [facility]   [phone]

## 2023-02-21 ENCOUNTER — TELEPHONE (OUTPATIENT)
Dept: OBGYN CLINIC | Facility: HOSPITAL | Age: 28
End: 2023-02-21

## 2023-02-21 NOTE — TELEPHONE ENCOUNTER
Caller: Diana Ahumada     Doctor: Shayla Hart     Reason for call: Needs to r/s appt for right pinky fx  She said she needs to get in this week       Call back#: [phone]

## 2023-02-22 ENCOUNTER — OFFICE VISIT (OUTPATIENT)
Dept: OBGYN CLINIC | Facility: CLINIC | Age: 28
End: 2023-02-22

## 2023-02-22 VITALS
SYSTOLIC BLOOD PRESSURE: 119 MMHG | DIASTOLIC BLOOD PRESSURE: 80 MMHG | BODY MASS INDEX: 27.83 KG/M2 | HEIGHT: 64 IN | WEIGHT: 163 LBS

## 2023-02-22 DIAGNOSIS — S62.609A FINGER FRACTURE: ICD-10-CM
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
## 2023-02-22 NOTE — PROGRESS NOTES
Assessment:    Right small finger middle phalanx fracture s/p dog dite  Superficial lacerations healed   DOI:  2/4/2023  X-rays today demonstrate healing fracture in good alignment  Plan: At this point, passive and active range of motion is recommended  Can be aggressive as tolerated  Eventual strength will return once range of motion is improved  No splint immobilization is necessary  Work note provided  Follow-up as needed  Visit Diagnoses     Dog bite, initial encounter        Relevant Orders    XR finger right fifth digit-pinkie    Finger fracture                       Subjective:     HPI    Patient ID:  Johnathan Crowder is a right hand dominant  28 y o  female presenting for evaluation of the right small finger  According to the patient, about 3 weeks ago she was bitten by her dog which causing a laceration wound of the dorsal and volar surface of the small finger  She presented to the ER where the wound was cleansed and was told it was superficial   She was provided antibiotics for which she has completed  She was also told she had a fracture and was splinted  She was referred here for further evaluation  Today she states that the superficial lacerations have healed  She still has pain well localized to the middle region of the small finger  No associated numbness and tingling  It is associated with stiffness  She is a  by trade  The following portions of the patient's history were reviewed and updated as appropriate: allergies, current medications, past family history, past medical history, past social history, past surgical history, and problem list     Review of Systems     Objective:    Imaging:   New right small finger x-rays performed today demonstrate callus formation at the fracture site with good alignment  No further displacement      Physical Exam     Vitals:    02/22/23 0812   BP: 119/80       General appearance:  NAD   Cardiac:  Regular rate  Lungs: Unlabored breathing  Abdomen:  Non-distended    Orthopedic Examination:  Right small finger     Inspection: Superficial lacerations are healed  There is mild swelling, no erythema  No open wound  Palpation: Mild tenderness to palpation at the fracture site  Range-of-motion: Limited range of motion DIP and PIP joints due to stiffness  Able o fire flexor tendons      Strength:  Deferred    Sensation:  Intact     Special Tests:  Good cap refill at fingertip

## 2023-02-22 NOTE — LETTER
February 22, 2023     Patient: Sabiha Dumas  YOB: 1995  Date of Visit: 2/22/2023      To Whom it May Concern:    Sabiha Dumas is under my professional care  Keli Ribeiro was seen in my office on 2/22/2023  Keli Ribeiro may return to work without restrictions 2/27/2023  If you have any questions or concerns, please don't hesitate to call           Sincerely,          Hannah Vanegas MD        CC: No Recipients

## 2024-05-04 ENCOUNTER — NURSE TRIAGE (OUTPATIENT)
Dept: OTHER | Facility: OTHER | Age: 29
End: 2024-05-04

## 2024-05-04 NOTE — TELEPHONE ENCOUNTER
Pt looking to establish with a PCP.  Booked earliest new patient exam available that system would allow but pt would like to be seen sooner if possible.

## 2024-05-04 NOTE — TELEPHONE ENCOUNTER
"Regarding: behavioral health / appt w/ new pcp request  ----- Message from Laurel Perdue sent at 5/4/2024 11:16 AM EDT -----  \"I would like to establish with a pcp as soon as possible because I need a referral to a psychiatrist and the places I've called all say there's a long wait or I need a referral. I'd really like to get seen asap for medication\"      Pt ok w/any location--did find availability on Tuesday at [facility] for NP but I am unable to schedule sooner than 2 weeks and pt would like sooner. Also provided # for Behavioral Health intake line.    "

## 2024-05-28 ENCOUNTER — TELEPHONE (OUTPATIENT)
Dept: ADMINISTRATIVE | Facility: OTHER | Age: 29
End: 2024-05-28

## 2024-05-28 NOTE — TELEPHONE ENCOUNTER
----- Message from Hope LOBATO sent at 5/28/2024 10:02 AM EDT -----  Regarding: HEP C RESULTS  05/28/24 10:02 AM    Hello, our patient Tasha Toth has had Hepatitis C completed/performed. Please assist in updating the patient chart by pulling the Care Everywhere (CE) document. The date of service is 03/01/2019.     Thank you,  Hope Maldonado  [facility] PRIMARY [facility]

## 2024-05-28 NOTE — TELEPHONE ENCOUNTER
Upon review of the In Basket request we were able to locate, review, and update the patient chart as requested for Hepatitis C  and HIV.    Any additional questions or concerns should be emailed to the Practice Liaisons via the appropriate education email address, please do not reply via In Basket.    Thank you  SISSY CARLTON

## 2024-05-28 NOTE — TELEPHONE ENCOUNTER
----- Message from Hope LOBATO sent at 5/28/2024 10:01 AM EDT -----  Regarding: HIV RESULTS  05/28/24 10:01 AM    Hello, our patient Tasha Toth has had HIV completed/performed. Please assist in updating the patient chart by pulling the Care Everywhere (CE) document. The date of service is 03/01/2019.     Thank you,  Hope Maldonado  [facility] PRIMARY CARE

## 2024-05-29 ENCOUNTER — TELEPHONE (OUTPATIENT)
Dept: PSYCHIATRY | Facility: CLINIC | Age: 29
End: 2024-05-29

## 2024-05-29 ENCOUNTER — OFFICE VISIT (OUTPATIENT)
Dept: FAMILY MEDICINE CLINIC | Facility: CLINIC | Age: 29
End: 2024-05-29
Payer: COMMERCIAL

## 2024-05-29 VITALS
BODY MASS INDEX: 29.39 KG/M2 | RESPIRATION RATE: 16 BRPM | DIASTOLIC BLOOD PRESSURE: 80 MMHG | HEART RATE: 71 BPM | WEIGHT: 176.4 LBS | SYSTOLIC BLOOD PRESSURE: 118 MMHG | TEMPERATURE: 96.3 F | HEIGHT: 65 IN | OXYGEN SATURATION: 98 %

## 2024-05-29 DIAGNOSIS — E66.3 OVERWEIGHT (BMI 25.0-29.9): ICD-10-CM

## 2024-05-29 DIAGNOSIS — F41.9 ANXIETY: ICD-10-CM

## 2024-05-29 DIAGNOSIS — F31.9 BIPOLAR 1 DISORDER (HCC): ICD-10-CM

## 2024-05-29 DIAGNOSIS — F10.10 ALCOHOL CONSUMPTION BINGE DRINKING: ICD-10-CM

## 2024-05-29 DIAGNOSIS — F90.9 ATTENTION DEFICIT HYPERACTIVITY DISORDER (ADHD), UNSPECIFIED ADHD TYPE: ICD-10-CM

## 2024-05-29 DIAGNOSIS — Z13.220 SCREENING FOR HYPERLIPIDEMIA: ICD-10-CM

## 2024-05-29 DIAGNOSIS — Z72.0 TOBACCO ABUSE: ICD-10-CM

## 2024-05-29 DIAGNOSIS — Z00.00 HEALTH CARE MAINTENANCE: Primary | ICD-10-CM

## 2024-05-29 PROCEDURE — 99385 PREV VISIT NEW AGE 18-39: CPT | Performed by: FAMILY MEDICINE

## 2024-05-29 NOTE — PROGRESS NOTES
Ambulatory Visit  Name: Tasha Toth      : 1995      MRN: [id number]  Encounter Provider: Emely Hightower DO  Encounter Date: 2024   Encounter department: [facility] PRIMARY CARE  Chief Complaint   Patient presents with    Establish Care     Pt would like a referral for psychiatry      Patient Instructions   Here to establish care and rec updated labs and rec psychiatry consult for hx of bipolar disorder and hx of binge drinking and adhd complaints as well as anxiety. Rec seeing Gyn and rec eating healthy and exercising to get BMI lower than 25. Rec using sunscreen and monitoring for ticks. Rec also to call if any problems or ER if any suicidal ideation. Quit tobacco. Rec seeing gyn as directed for routine cervical cancer screening.     Assessment & Plan   1. Health care maintenance  2. Bipolar 1 disorder (HCC)  3. Attention deficit hyperactivity disorder (ADHD), unspecified ADHD type  4. Anxiety  5. Alcohol consumption binge drinking  6. Overweight (BMI 25.0-29.9)  7. Tobacco abuse    [unfilled]  History of Present Illness     Here for establishing care Bipolar d/o since 2019 hospitalized and also adhd and is up and down and can't concentrate. Patient is a . Patient is single and has no children. Patient was on abilify and it caused issues when drinking and stopped abilify on own. Patient drinks 1-2 times weekly and drinks heavily 7-10 drinks. Patient binge drinks. Last time seen by psychiatry in 2019 when patient was hospitalized. Patient smokes 7 cigarettes per day. Mother with anxiety and father with Bipolar disorder.         Review of Systems   Constitutional: Negative.    HENT: Negative.     Eyes: Negative.    Respiratory: Negative.     Cardiovascular: Negative.    Gastrointestinal: Negative.    Endocrine: Negative.    Genitourinary: Negative.    Musculoskeletal: Negative.    Skin: Negative.    Allergic/Immunologic: Negative.    Neurological: Negative.    Hematological:  "Negative.    Psychiatric/Behavioral:          Adhd, and bipolar disorder complaints.        Objective     /80   Pulse 71   Temp (!) 96.3 °F (35.7 °C) (Temporal)   Resp 16   Ht 5' 5\" (1.651 m)   Wt 80 kg (176 lb 6.4 oz)   SpO2 98%   BMI 29.35 kg/m²     Physical Exam  Constitutional:       Appearance: She is well-developed.      Comments: overweight   HENT:      Head: Normocephalic and atraumatic.      Right Ear: External ear normal.      Left Ear: External ear normal.      Nose: Nose normal.      Mouth/Throat:      Mouth: Mucous membranes are moist.   Eyes:      Conjunctiva/sclera: Conjunctivae normal.      Pupils: Pupils are equal, round, and reactive to light.   Cardiovascular:      Rate and Rhythm: Normal rate and regular rhythm.      Heart sounds: Normal heart sounds.   Pulmonary:      Effort: Pulmonary effort is normal.      Breath sounds: Normal breath sounds.   Musculoskeletal:         General: Normal range of motion.      Cervical back: Normal range of motion and neck supple.   Skin:     General: Skin is warm and dry.      Capillary Refill: Capillary refill takes less than 2 seconds.   Neurological:      General: No focal deficit present.      Mental Status: She is alert and oriented to person, place, and time. Mental status is at baseline.      Deep Tendon Reflexes: Reflexes are normal and symmetric.   Psychiatric:         Mood and Affect: Mood normal.         Behavior: Behavior normal.         Thought Content: Thought content normal.         Judgment: Judgment normal.      Comments: Has bipolar disorder and adhd symptoms of poor focus and anxiety issues as well that affects relationship skills.        Administrative Statements   I have spent a total time of 30 minutes on 05/29/24 In caring for this patient including Diagnostic results, Prognosis, Risks and benefits of tx options, Instructions for management, Patient and family education, Importance of tx compliance, Risk factor reductions, " Impressions, Counseling / Coordination of care, Documenting in the medical record, Reviewing / ordering tests, medicine, procedures  , and Obtaining or reviewing history  .

## 2024-05-29 NOTE — PATIENT INSTRUCTIONS
Here to establish care and rec updated labs and rec psychiatry consult for hx of bipolar disorder and hx of binge drinking and adhd complaints as well as anxiety. Rec seeing Gyn and rec eating healthy and exercising to get BMI lower than 25. Rec using sunscreen and monitoring for ticks. Rec also to call if any problems or ER if any suicidal ideation. Quit tobacco. Rec seeing gyn as directed for routine cervical cancer screening.

## 2024-05-29 NOTE — TELEPHONE ENCOUNTER
Contacted patient in regards to ASAP Referral  in attempts to verify patient's needs of services and add patient to proper wait list. spoke with patient whom stated is interested in med management in [facility] with next available provider. Pt was already on wait list and has been changed to high priority. Closing referral.

## 2024-05-30 ENCOUNTER — APPOINTMENT (OUTPATIENT)
Dept: LAB | Facility: HOSPITAL | Age: 29
End: 2024-05-30
Payer: COMMERCIAL

## 2024-05-30 DIAGNOSIS — E66.3 OVERWEIGHT (BMI 25.0-29.9): ICD-10-CM

## 2024-05-30 DIAGNOSIS — F41.9 ANXIETY: ICD-10-CM

## 2024-05-30 DIAGNOSIS — Z00.00 HEALTH CARE MAINTENANCE: ICD-10-CM

## 2024-05-30 DIAGNOSIS — Z72.0 TOBACCO ABUSE: ICD-10-CM

## 2024-05-30 DIAGNOSIS — F90.9 ATTENTION DEFICIT HYPERACTIVITY DISORDER (ADHD), UNSPECIFIED ADHD TYPE: ICD-10-CM

## 2024-05-30 DIAGNOSIS — Z13.220 SCREENING FOR HYPERLIPIDEMIA: ICD-10-CM

## 2024-05-30 DIAGNOSIS — F10.10 ALCOHOL CONSUMPTION BINGE DRINKING: ICD-10-CM

## 2024-05-30 DIAGNOSIS — F31.9 BIPOLAR 1 DISORDER (HCC): ICD-10-CM

## 2024-05-30 LAB
ALBUMIN SERPL BCP-MCNC: 4.5 G/DL (ref 3.5–5)
ALP SERPL-CCNC: 35 U/L (ref 34–104)
ALT SERPL W P-5'-P-CCNC: 14 U/L (ref 7–52)
ANION GAP SERPL CALCULATED.3IONS-SCNC: 6 MMOL/L (ref 4–13)
AST SERPL W P-5'-P-CCNC: 17 U/L (ref 13–39)
BASOPHILS # BLD AUTO: 0.03 THOUSANDS/ÂΜL (ref 0–0.1)
BASOPHILS NFR BLD AUTO: 1 % (ref 0–1)
BILIRUB SERPL-MCNC: 0.95 MG/DL (ref 0.2–1)
BUN SERPL-MCNC: 18 MG/DL (ref 5–25)
CALCIUM SERPL-MCNC: 9.5 MG/DL (ref 8.4–10.2)
CHLORIDE SERPL-SCNC: 101 MMOL/L (ref 96–108)
CHOLEST SERPL-MCNC: 149 MG/DL
CO2 SERPL-SCNC: 27 MMOL/L (ref 21–32)
CREAT SERPL-MCNC: 0.81 MG/DL (ref 0.6–1.3)
EOSINOPHIL # BLD AUTO: 0.21 THOUSAND/ÂΜL (ref 0–0.61)
EOSINOPHIL NFR BLD AUTO: 3 % (ref 0–6)
ERYTHROCYTE [DISTWIDTH] IN BLOOD BY AUTOMATED COUNT: 12.5 % (ref 11.6–15.1)
GFR SERPL CREATININE-BSD FRML MDRD: 97 ML/MIN/1.73SQ M
GLUCOSE P FAST SERPL-MCNC: 92 MG/DL (ref 65–99)
HCT VFR BLD AUTO: 40.7 % (ref 34.8–46.1)
HDLC SERPL-MCNC: 82 MG/DL
HGB BLD-MCNC: 13.3 G/DL (ref 11.5–15.4)
IMM GRANULOCYTES # BLD AUTO: 0.02 THOUSAND/UL (ref 0–0.2)
IMM GRANULOCYTES NFR BLD AUTO: 0 % (ref 0–2)
LDLC SERPL CALC-MCNC: 50 MG/DL (ref 0–100)
LYMPHOCYTES # BLD AUTO: 2.24 THOUSANDS/ÂΜL (ref 0.6–4.47)
LYMPHOCYTES NFR BLD AUTO: 34 % (ref 14–44)
MCH RBC QN AUTO: 29.2 PG (ref 26.8–34.3)
MCHC RBC AUTO-ENTMCNC: 32.7 G/DL (ref 31.4–37.4)
MCV RBC AUTO: 90 FL (ref 82–98)
MONOCYTES # BLD AUTO: 0.66 THOUSAND/ÂΜL (ref 0.17–1.22)
MONOCYTES NFR BLD AUTO: 10 % (ref 4–12)
NEUTROPHILS # BLD AUTO: 3.45 THOUSANDS/ÂΜL (ref 1.85–7.62)
NEUTS SEG NFR BLD AUTO: 52 % (ref 43–75)
NRBC BLD AUTO-RTO: 0 /100 WBCS
PLATELET # BLD AUTO: 225 THOUSANDS/UL (ref 149–390)
PMV BLD AUTO: 10.6 FL (ref 8.9–12.7)
POTASSIUM SERPL-SCNC: 4 MMOL/L (ref 3.5–5.3)
PROT SERPL-MCNC: 7.1 G/DL (ref 6.4–8.4)
RBC # BLD AUTO: 4.55 MILLION/UL (ref 3.81–5.12)
SODIUM SERPL-SCNC: 134 MMOL/L (ref 135–147)
T4 FREE SERPL-MCNC: 0.71 NG/DL (ref 0.61–1.12)
TRIGL SERPL-MCNC: 84 MG/DL
TSH SERPL DL<=0.05 MIU/L-ACNC: 1.46 UIU/ML (ref 0.45–4.5)
WBC # BLD AUTO: 6.61 THOUSAND/UL (ref 4.31–10.16)

## 2024-05-30 PROCEDURE — 84439 ASSAY OF FREE THYROXINE: CPT

## 2024-05-30 PROCEDURE — 85025 COMPLETE CBC W/AUTO DIFF WBC: CPT

## 2024-05-30 PROCEDURE — 80053 COMPREHEN METABOLIC PANEL: CPT

## 2024-05-30 PROCEDURE — 80061 LIPID PANEL: CPT

## 2024-05-30 PROCEDURE — 84443 ASSAY THYROID STIM HORMONE: CPT

## 2024-05-30 PROCEDURE — 36415 COLL VENOUS BLD VENIPUNCTURE: CPT

## 2024-08-20 ENCOUNTER — TELEPHONE (OUTPATIENT)
Age: 29
End: 2024-08-20

## 2024-08-20 DIAGNOSIS — F31.9 BIPOLAR 1 DISORDER (HCC): Primary | ICD-10-CM

## 2024-08-20 RX ORDER — ARIPIPRAZOLE 5 MG/1
5 TABLET ORAL DAILY
Qty: 30 TABLET | Refills: 1 | Status: SHIPPED | OUTPATIENT
Start: 2024-08-20

## 2024-08-20 NOTE — TELEPHONE ENCOUNTER
I called and spoke with the patient and made her aware she verbalized understanding and agreement.

## 2024-08-20 NOTE — TELEPHONE ENCOUNTER
Patient called the RX Refill Line. Message is being forwarded to the office.     Patient is requesting ambilify to be refilled.    She state it was a little pill maybe 5 mg and she took once daily was prescribed by psychiatry when she was admitted.     Patient state she really need to get back on the ambilify because not having anything to help her is affecting everything including her job.  Patient state she cannot afford to check herself into a [place], that she cannot afford that and ambilify was helping. She says she have been waiting for 4 months for the psychiatry to call her back (then she says she has been waiting for months she just picked a number, for them to call her and refill the ambilify) but she said need something.     Patient state she cannot get up to go to work and she would appreciate if this can be ordered today she really need something to help that it has been nightmare. She is was pleading for help she also used obscene language but not directed to me, while expressing herself.    State if this can be sent today she will appreciate it and it can be sent to [facility] on file    Patient may be contacted at [phone] with questions or concerns

## 2024-08-29 ENCOUNTER — TELEPHONE (OUTPATIENT)
Dept: PSYCHIATRY | Facility: CLINIC | Age: 29
End: 2024-08-29

## 2024-08-29 NOTE — TELEPHONE ENCOUNTER
One week follow up call for New Patient appointment with   SENIA Somers   on 10/01/2024 was made on 08/29/2024. Writer informed patient of New Patient paperwork needing to be completed 5 days prior to the appointment. Writer confirmed paperwork has been sent via [employer].    Appointment was made on: 08/22/2024

## 2024-09-09 ENCOUNTER — VBI (OUTPATIENT)
Dept: ADMINISTRATIVE | Facility: OTHER | Age: 29
End: 2024-09-09

## 2024-09-09 NOTE — TELEPHONE ENCOUNTER
09/09/24 8:38 AM     Chart reviewed for Pap Smear (HPV) aka Cervical Cancer Screening was/were not submitted to the patient's insurance.     Arlette Abrams MA   [facility]

## 2024-09-27 ENCOUNTER — TELEPHONE (OUTPATIENT)
Dept: PSYCHIATRY | Facility: CLINIC | Age: 29
End: 2024-09-27

## 2024-09-27 NOTE — TELEPHONE ENCOUNTER
Pt returned missed call regarding appt with Matthew Reece.  Writer confirmed that pt has completed NP forms via [employer] and is aware of appt on 10/01 14:00 with Matthew Reece.    Writer will send encounter to PF Office.

## 2024-10-01 ENCOUNTER — TELEPHONE (OUTPATIENT)
Dept: PSYCHIATRY | Facility: CLINIC | Age: 29
End: 2024-10-01

## 2024-10-01 NOTE — TELEPHONE ENCOUNTER
Writer called and left voicemail regarding no providers at [facility] take client's insurance. Writer apologized for not catching it at time of confirmation and referred client back to the [facility] to schedule at a [facility] location that does.    [facility] has already been notified to call client to reschedule; however, client can also call in to reschedule.

## 2024-10-17 DIAGNOSIS — F31.9 BIPOLAR 1 DISORDER (HCC): ICD-10-CM

## 2024-10-17 RX ORDER — ARIPIPRAZOLE 5 MG/1
5 TABLET ORAL DAILY
Qty: 30 TABLET | Refills: 1 | Status: SHIPPED | OUTPATIENT
Start: 2024-10-17

## 2024-12-11 ENCOUNTER — OFFICE VISIT (OUTPATIENT)
Dept: PSYCHIATRY | Facility: CLINIC | Age: 29
End: 2024-12-11

## 2024-12-11 DIAGNOSIS — Z91.49 HISTORY OF PSYCHOLOGICAL TRAUMA: ICD-10-CM

## 2024-12-11 DIAGNOSIS — F90.9 ATTENTION DEFICIT HYPERACTIVITY DISORDER (ADHD), UNSPECIFIED ADHD TYPE: ICD-10-CM

## 2024-12-11 DIAGNOSIS — Z78.9 ALCOHOL USE: ICD-10-CM

## 2024-12-11 DIAGNOSIS — F41.1 GAD (GENERALIZED ANXIETY DISORDER): ICD-10-CM

## 2024-12-11 DIAGNOSIS — F31.81 BIPOLAR 2 DISORDER, MAJOR DEPRESSIVE EPISODE (HCC): Primary | ICD-10-CM

## 2024-12-11 DIAGNOSIS — G47.00 INSOMNIA, UNSPECIFIED TYPE: ICD-10-CM

## 2024-12-11 DIAGNOSIS — F15.10 ADDERALL USE DISORDER, MILD, ABUSE (HCC): ICD-10-CM

## 2024-12-11 DIAGNOSIS — Z72.0 TOBACCO ABUSE: ICD-10-CM

## 2024-12-11 PROCEDURE — 90792 PSYCH DIAG EVAL W/MED SRVCS: CPT | Performed by: PSYCHIATRY & NEUROLOGY

## 2024-12-11 RX ORDER — NICOTINE 21 MG/24HR
1 PATCH, TRANSDERMAL 24 HOURS TRANSDERMAL EVERY 24 HOURS
Qty: 28 PATCH | Refills: 2 | Status: SHIPPED | OUTPATIENT
Start: 2024-12-11

## 2024-12-11 RX ORDER — ARIPIPRAZOLE 5 MG/1
5 TABLET ORAL DAILY
Qty: 30 TABLET | Refills: 1 | Status: SHIPPED | OUTPATIENT
Start: 2024-12-11

## 2024-12-11 RX ORDER — TRAZODONE HYDROCHLORIDE 50 MG/1
50 TABLET, FILM COATED ORAL
Qty: 30 TABLET | Refills: 2 | Status: SHIPPED | OUTPATIENT
Start: 2024-12-11

## 2024-12-11 NOTE — PSYCH
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 demonstrates ability to act in a self-preserving manner as evidenced by volitionally seeking psychiatric evaluation and treatment today. To mitigate future risk, patient should adhere to treatment recommendations, avoid alcohol/illicit substance use, utilize community-based resources and familiar support, and prioritize mental health treatment.          Controlled Medication Discussion:   Not applicable  Discussed with Tasha need to slowly taper off benzodiazepines as recommended by [place] Prescription Drug Monitoring Program, due to concurrent use of opioid medications and increased risk of sedation, respiratory depression, coma and death with that combination  PDMP Review         Value Time User    PDMP Reviewed  Yes 12/11/2024  7:41 PM Bartolo Patel MD            Suicide/Homicide Risk Assessment:    The following interventions are recommended: continue medication management, contracts for safety at present - agrees to go to ED if feeling unsafe, contracts for safety at present - agrees to call Crisis Intervention Service if feeling unsafe. Although patient's acute lethality risk is low, long-term/chronic lethality risk is mildly elevated in the presence of anxiety, depression, bipolar, substance abuse. At the current moment, Tasha is future-oriented,  forward-thinking, and demonstrates ability to act in a self-preserving manner as evidenced by volitionally presenting to the clinic today, seeking treatment. At this juncture, inpatient hospitalization is not currently warranted. To mitigate future risk, patient should adhere to the recommendations of this writer, avoid alcohol/illicit substance use, utilize community-based resources and familiar support and prioritize mental health treatment. Based on today's assessment and clinical criteria, Tasha Toth contracts for safety and is not an imminent risk of harm to self or others. Outpatient level of care is deemed appropriate at this present time. Tasha understands that if they are no longer able to contract for safety, they need to call/contact the outpatient office including this writer, call/contact crisis and/orattend to the nearest Emergency Department for immediate evaluation.    Presently, patient denies suicidal/homicidal ideation in addition to thoughts of self-injury; contracts for safety, see below for risk assessment.  At conclusion of evaluation, patient is amenable to the recommendations of this writer including: starting/continuing/adjusting psychotropic medications as prescribed.  Also, patient is amenable to calling/contacting the outpatient office including this writer if any acute adverse effects of their medication regimen arise in addition to any comments or concerns pertaining to their psychiatric management.  Patient is amenable to calling/contacting crisis and/or attending to the nearest emergency department if their clinical condition deteriorates to assure their safety and stability, stating that they are able to appropriately confide in their supports regarding their psychiatric state.    -----------------------------------    Medical Decision Making / Counseling / Coordination of Care:  Recent stressors were discussed with the patient. The diagnosis and treatment plan were reviewed  with the patient. Risks, benefits, and alternativies to treatment were discussed, including a myriad of potential adverse medication side effects, to which Tasha voiced understanding and consented fully to treatment. The importance of medication and treatment compliance was reviewed with the patient. Individual psychotherapy provided: Supportive psychotherapy.     Note: This chart was completed utilizing speech software.  Grammatical errors, random word insertions, pronoun errors, and incomplete sentences are an occasional consequence of the system due to software limitation, ambient noise, and hardware issues.  Any formal questions or concerns about the context, text, or information contained within the body of this dictation should be directly addressed to the physician for clarification.    Bartolo Patel MD    This note was not shared with the patient due to reasonable likelihood of causing patient harm

## 2024-12-11 NOTE — BH TREATMENT PLAN
TREATMENT PLAN        [facility]    Name and Date of Birth:  Tasha Toth 29 y.o. 1995  Date of Treatment Plan: December 11, 2024  Diagnosis/Diagnoses:    1. Bipolar 2 disorder, major depressive episode (HCC)    2. Attention deficit hyperactivity disorder (ADHD), unspecified ADHD type by history    3. ANGELLA (generalized anxiety disorder)    4. Insomnia, unspecified type    5. Alcohol use    6. Tobacco abuse    7. History of psychological trauma        Strengths/Personal Resources for Self-Care: supportive family, supportive friends, taking medications as prescribed, ability to adapt to life changes, ability to communicate needs, ability to communicate well, ability to listen, ability to reason    Area/Areas of need: anxiety, anxiety symptoms, depression, depressive symptoms, mood instability, mood swings    Long Term Goal: maintain control of mood stability  Target Date: 6 months - June 11, 2025  Person/Persons responsible for completion of goal: Bella Patel MD     Short Term Objective (s) - How will we reach this goal?:   Take medications as prescribed  Attend psychiatry appointments regularly  Get blood work drawn  Continue psychotherapy regularly  Practice coping skills  Try sleep hygiene techniques  Avoid alcohol   Avoid drugs   Take walks regularly  Try breathing exercises  Try relaxation techniques  Target Date: 6 months - June 11, 2025  Person/Persons Responsible for Completion of Goal: Tasha     Progress Towards Goals: Continuing treatment    Treatment Modality: medication management every 1-3 months as needed and follow up with PCP  Review due 180 days from date of this plan: June 9, 2025   Expected length of service: Ongoing treatment    My physician and I have developed this plan together, and I agree to work on the goals and objectives. I understand the treatment goals that were developed for my treatment.    The treatment plan was  created between Bartolo Patel MD and Tasha Toth on 12/11/24 at 11:24 AM but not signed at the time of the visit due to COVID social distancing. The plan was reviewed, and verbal consent was given.

## 2024-12-11 NOTE — BH CRISIS PLAN
Client Name: Tasha Toth       Client YOB: 1995    [facility] Safety Plan      Creation Date: 12/11/24 Update Date: 12/11/24   Created By: Bartolo Patel MD Last Updated By: Bartolo Patel MD      Step 1: Warning Signs:   Warning Signs   increase anxiety   sadness feeling   racing thoughts            Step 2: Internal Coping Strategies:   Internal Coping Strategies   distract self   stays busy            Step 3: People and social settings that provide distraction:   Name Contact Information   call friends     Places   walks alot           Step 4: People whom I can ask for help during a crisis:      Name Contact Information    call friends       Step 5: Professionals or agencies I can contact during a crisis:      Clinican/Agency Name Phone Emergency Contact    Suicide Prevention Lifeline: Call or Text 988        Local Emergency Department Emergency Department Phone Emergency Department Address    911          Crisis Phone Numbers:   Suicide Prevention Lifeline: Call or Text  988 Crisis Text Line: Text HOME to 358-368   Please note: Some [facility] do not have a separate number for Child/Adolescent specific crisis. If your county is not listed under Child/Adolescent, please call the adult number for your county      Adult Crisis Numbers: Child/Adolescent Crisis Numbers   [facility]: [phone] [facility]: [phone]   [facility]: [phone] [facility]: [phone]   [facility]: [phone] [place]: [phone]   [facility]: [phone] [place]: [phone]   [facility]: [phone]   [facility]: [phone]   [facility]: [phone]   [place] [facility]: [phone] (daytime) [phone] (after hours, weekends, holidays)      Step 6: Making the environment safer (plan for lethal means safety):   Patient did not identify any lethal methods: Yes     Optional: What is most important to me and  worth living for?   Dogs, family, job     [place] Safety Plan. Allison Banks and Arsalan Islas. Used with permission of the authors.

## 2024-12-11 NOTE — PATIENT INSTRUCTIONS
"Please call the office nursing staff for medication issues including refills, problems getting medications, bothersome side effects, etc., at [phone].     Please return for a follow up appointment as discussed and arrive approximately 15 minutes prior to your appointment time. If you are running late or are unable to attend your appointment, please call our [place] office at [phone] (fax: [phone]), or if you were seen in the [facility] office, please call [phone] (fax [phone]).      Recommendations regarding insomnia:  - Keep a consistent sleep schedule. Get up at the same time every day, even on weekends or during vacations.  - Set a bedtime that is early enough for you to get at least 7-8 hours of sleep.  - Don’t go to bed unless you are sleepy.  - If you don’t fall asleep after 20 minutes, get out of bed and take a brief \"break\". Go to a quiet activity without a lot of light exposure. It is especially important to not get on electronics. During this \"break,\" you might consider sitting in a chair and reading a boring book or listening to soft music, until your eyelids start to feel heavy.  Then, would go back to sleep and try again.    - Establish a relaxing bedtime routine.  - Make your bedroom quiet and relaxing. Keep the room at a comfortable, cool temperature.  - Limit exposure to bright light in the evenings.  - Turn off electronic devices at least 30 minutes before bedtime.  - Avoid watching stressful or suspenseful TV programs right before bedtime.  - Don’t eat a large meal before bedtime. If you are hungry at night, eat a light, healthy snack.  - Exercise regularly and maintain a healthy diet.  Participate in regular physical activities like exercise, although avoid approximately 3-4 hours prior to bed.  Morning exercise is ideal.  - Avoid consuming caffeine in the afternoon or evening.  - Avoid consuming alcohol before bedtime.  - Reduce your fluid intake before " "bedtime.  - Avoid daytime napping.  - Consider reading \"No More Sleepless nights\" by Atilio Kat, Ph.D.  - Consider use of online resources including:  - http://[employer]/cbt-online-insomnia-treatment.html  - http://www.[employer]  - CBT-I  Oli on your Smart Phone.  - Go! To Sleep by the [facility].    Look up \"grounding techniques\" and/or \"anchoring demonstration\" online and try a few to see what may work for you. Practice these skills before you need them, when you are not feeling too anxious or triggered. You can also search for free guided meditation videos online to help improve your head space when you are feeling very anxious or triggered.    Follow these tips to establish healthy sleep habits:          Healthy Diet   The American Heart Association and the American College of Cardiology have long recommended a healthy diet for not only patients who are at risk for atherosclerotic cardiovascular disease (ASCVD) but also the general public. In keeping with this evidence-based recommendation, the \"2018 Guideline on the Management of Blood Cholesterol\" stresses that a healthy diet should include adequate intake of these essentials:   Vegetables, fruits, and whole grains   Legumes and nuts   Low-fat dairy products   Low-fat poultry (without the skin)   Fish and seafood   Nontropical vegetable oils     The recent guidelines do provide room for cultural food preferences in a healthy diet, but in general, all patients should limit their intake of saturated and avoid all trans fats, sweets, sugar-sweetened beverages, and red meats.  Please maintain adequate hydration of at least 2 Liters of water per day, and improve nutrition by decreasing portion sizes, avoiding fried foods, fast foods, inappropriate snacking and overly processed and packaged items with 'added sugars' (whether in drinks or foods), and observing nutritional facts information on any items that are packaged to reduce intake of saturated " fats and AVOID trans fats as mentioned above. Again, consume whole foods such as vegetables, higher lean protein intake, and using healthier lifestyle plans such as the Mediterranean diet with healthier fats such as those from seeds, nuts, and using organic extra virgin olive oil or avocado oil in lieu of processed vegetable oils or margarine.    Physical Activity   Recommended DAILY exercise for at least 150 minutes of moderate exercise weekly!  In addition to a healthy diet, all patients should include regular physical activity in their weekly routines, at moderate to vigorous intensity. Any activity is better than nothing, so if your patients can't meet the recommendation of vigorous activity, moderate-intensity activity can still help them reduce their risk of ASCVD.     Below are the American Heart Association's recommendations for physical activity per week (preferably spread throughout the week):     For Overall Cardiovascular Health and Lowering Cholesterol   At least 150 minutes of moderate-intensity physical activity (for example, 30 minutes, 5 days a week), or   At least 75 minutes of vigorous-intensity physical activity (for example, 25 minutes, 3 days a week); or   A combination of moderate- and vigorous-intensity aerobic activity, and   At least 2 days of moderate- to high-intensity muscle-strengthening activities (such as resistance weight training) for additional health benefits    If you have thoughts of harming yourself or are otherwise in psychological crisis, do not hesitate to contact your County Crisis hotline, or 911 or go to the nearest emergency room.  Adult Crisis Numbers  Suicide Prevention Hotline - Dial 9-8-8  [facility]: [phone] or [phone]  [facility]: [phone]  [facility]: [phone]  [facility]: [phone]  [place]: [phone] or [phone]  [facility]: [phone]  [facility]: [phone] or [facility]  Crisis: [phone]  [place] [facility]: [phone] (daytime).       [phone] (after hours, weekends, holidays)     Child/Adolescent Crisis Numbers   [facility]: [phone]   [facility]: [phone]   [place], [place]: [phone]   [place]: [phone]  Please note: Some [facility] do not have a separate number for Child/Adolescent specific crisis. If your county is not listed under Child/Adolescent, please call the adult number for your county     National Talk to Text Line   [employer] - 559-815    National Suicide Prevention Hotline: 1-122.648.3080 or call 268.

## 2025-03-06 ENCOUNTER — TELEPHONE (OUTPATIENT)
Dept: PSYCHIATRY | Facility: CLINIC | Age: 30
End: 2025-03-06

## 2025-03-06 NOTE — TELEPHONE ENCOUNTER
NO-SHOW LETTER MAILED TO Tasha Toth.  ADDRESS: [address] [place] [phone]  SENT VIA [employer]

## 2025-05-01 ENCOUNTER — TELEPHONE (OUTPATIENT)
Dept: PSYCHIATRY | Facility: CLINIC | Age: 30
End: 2025-05-01

## 2025-05-01 NOTE — TELEPHONE ENCOUNTER
A medical records request was received 5/1/25 from [facility]. The date range is the last 2 years and asks for all psychiatric records with med list and treatment.    Paperwork placed in the mailbox of Dr Patel for his review.

## 2025-05-30 ENCOUNTER — TELEPHONE (OUTPATIENT)
Age: 30
End: 2025-05-30

## 2025-05-30 DIAGNOSIS — F31.81 BIPOLAR 2 DISORDER, MAJOR DEPRESSIVE EPISODE (HCC): ICD-10-CM

## 2025-05-30 DIAGNOSIS — F41.1 GAD (GENERALIZED ANXIETY DISORDER): ICD-10-CM

## 2025-05-30 DIAGNOSIS — G47.00 INSOMNIA, UNSPECIFIED TYPE: ICD-10-CM

## 2025-05-30 RX ORDER — TRAZODONE HYDROCHLORIDE 50 MG/1
50 TABLET ORAL
Qty: 30 TABLET | Refills: 2 | Status: SHIPPED | OUTPATIENT
Start: 2025-05-30 | End: 2025-06-04

## 2025-05-30 RX ORDER — ARIPIPRAZOLE 5 MG/1
5 TABLET ORAL DAILY
Qty: 30 TABLET | Refills: 2 | Status: SHIPPED | OUTPATIENT
Start: 2025-05-30 | End: 2025-06-04

## 2025-05-30 NOTE — TELEPHONE ENCOUNTER
We will provide refills and at appointment next week will discuss whether further increase in trazodone is necessary versus adjustments of other medications.    Patient requested refill of :  Requested Prescriptions     Signed Prescriptions Disp Refills    traZODone (DESYREL) 50 mg tablet 30 tablet 2     Sig: Take 1 tablet (50 mg total) by mouth daily at bedtime     Authorizing Provider: BARTOLO PATEL    sertraline (Zoloft) 50 mg tablet 30 tablet 2     Sig: Take 1 tablet (50 mg total) by mouth daily     Authorizing Provider: BARTOLO PATEL    ARIPiprazole (ABILIFY) 5 mg tablet 30 tablet 2     Sig: Take 1 tablet (5 mg total) by mouth daily     Authorizing Provider: BARTOLO PATEL         Refill request approved and sent to pharmacy on file per patient request.     Bartolo Patel MD

## 2025-05-30 NOTE — TELEPHONE ENCOUNTER
Mother of patient called in, with patient present.    Patient recently got out of being incarcerated and needed to schedule a medication management appointment.    Writer was able to schedule them on 6/4 @12:30    Mother was worried about patient being off of their medication till then.    Writer was able to successfully transfer the call to the nurses line for further assistance.

## 2025-05-30 NOTE — TELEPHONE ENCOUNTER
Nurse spoke with Tasha and her mother.     Tasha was released from retirement yesterday and needs refills of Abilify 5 mg, Zoloft 50 mg and Trazodone 50 mg - per med list, refills [facility] with [facility].     [facility] will call [facility] regarding same.     Tasha has been taking meds as prescribed during her time in retirement (past 30 days).     Tasha states in retirement, Trazodone was tried at 100 mg Q HS with no relief for sleep and would like to discuss same with Dr. Patel at appointment next week.

## 2025-06-04 ENCOUNTER — OFFICE VISIT (OUTPATIENT)
Dept: PSYCHIATRY | Facility: CLINIC | Age: 30
End: 2025-06-04

## 2025-06-04 DIAGNOSIS — Z72.0 TOBACCO ABUSE: ICD-10-CM

## 2025-06-04 DIAGNOSIS — F10.90 ALCOHOL USE: ICD-10-CM

## 2025-06-04 DIAGNOSIS — F41.1 GAD (GENERALIZED ANXIETY DISORDER): Primary | ICD-10-CM

## 2025-06-04 DIAGNOSIS — F12.21 CANNABIS USE DISORDER, SEVERE, IN EARLY REMISSION (HCC): ICD-10-CM

## 2025-06-04 DIAGNOSIS — F19.10 POLYSUBSTANCE ABUSE (HCC): ICD-10-CM

## 2025-06-04 DIAGNOSIS — F31.81 BIPOLAR 2 DISORDER, MAJOR DEPRESSIVE EPISODE (HCC): ICD-10-CM

## 2025-06-04 DIAGNOSIS — G47.00 INSOMNIA, UNSPECIFIED TYPE: ICD-10-CM

## 2025-06-04 DIAGNOSIS — Z91.49 HISTORY OF PSYCHOLOGICAL TRAUMA: ICD-10-CM

## 2025-06-04 DIAGNOSIS — F90.9 ATTENTION DEFICIT HYPERACTIVITY DISORDER (ADHD), UNSPECIFIED ADHD TYPE: ICD-10-CM

## 2025-06-04 PROCEDURE — 99214 OFFICE O/P EST MOD 30 MIN: CPT | Performed by: PSYCHIATRY & NEUROLOGY

## 2025-06-04 RX ORDER — ARIPIPRAZOLE 10 MG/1
10 TABLET ORAL DAILY
Qty: 30 TABLET | Refills: 2 | Status: SHIPPED | OUTPATIENT
Start: 2025-06-04

## 2025-06-04 RX ORDER — GABAPENTIN 100 MG/1
100 CAPSULE ORAL 3 TIMES DAILY
Qty: 90 CAPSULE | Refills: 2 | Status: SHIPPED | OUTPATIENT
Start: 2025-06-04

## 2025-06-04 RX ORDER — SERTRALINE HYDROCHLORIDE 100 MG/1
100 TABLET, FILM COATED ORAL DAILY
Qty: 30 TABLET | Refills: 2 | Status: SHIPPED | OUTPATIENT
Start: 2025-06-04

## 2025-06-04 RX ORDER — TRAZODONE HYDROCHLORIDE 100 MG/1
100 TABLET ORAL
Qty: 30 TABLET | Refills: 2 | Status: SHIPPED | OUTPATIENT
Start: 2025-06-04

## 2025-06-04 NOTE — PATIENT INSTRUCTIONS
"    Please call the office nursing staff for medication issues including refills, problems getting medications, bothersome side effects, etc., at [phone].     Please return for a follow up appointment as discussed and arrive approximately 15 minutes prior to your appointment time. If you are running late or are unable to attend your appointment, please call our [place] office at [phone] (fax: [phone]), or if you were seen in the [facility] office, please call [phone] (fax [phone]).    National Suicide Prevention Hotline: 1-702.890.8864 or call 288.    To improve sleep:  - Keep a consistent sleep schedule. Get up at the same time every day, even on weekends or during vacations.  - Set a bedtime that is early enough for you to get at least 7-8 hours of sleep.  - Don’t go to bed unless you are sleepy.  - If you don’t fall asleep after 20 minutes, get out of bed and take a brief \"break\". Go to a quiet activity without a lot of light exposure. It is especially important to not get on electronics. During this \"break,\" you might consider sitting in a chair and reading a boring book or listening to soft music, until your eyelids start to feel heavy.  Then, would go back to sleep and try again.    - Establish a relaxing bedtime routine.  - Make your bedroom quiet and relaxing. Keep the room at a comfortable, cool temperature.  - Limit exposure to bright light in the evenings.  - Turn off electronic devices at least 30 minutes before bedtime.  - Avoid watching stressful or suspenseful TV programs right before bedtime.  - Don’t eat a large meal before bedtime. If you are hungry at night, eat a light, healthy snack.  - Exercise regularly and maintain a healthy diet.  Participate in regular physical activities like exercise, although avoid approximately 3-4 hours prior to bed.  Morning exercise is ideal.  - Avoid consuming caffeine in the afternoon or evening.  - Avoid consuming alcohol " "before bedtime.  - Reduce your fluid intake before bedtime.  - Avoid daytime napping.  - Consider reading \"No More Sleepless nights\" by Atilio Kat, Ph.D.  - Consider use of online resources including:  - http://[employer]/cbt-online-insomnia-treatment.html  - http://www.[employer]  - CBT-I  Oli on your Smart Phone.  - Go! To Sleep by the [facility].    Look up \"grounding techniques\" and/or \"anchoring demonstration\" online and try a few to see what may work for you. Practice these skills before you need them, when you are not feeling too anxious or triggered. You can also search for free guided meditation videos online to help improve your head space when you are feeling very anxious or triggered.      Healthy Diet   The American Heart Association and the American College of Cardiology have long recommended a healthy diet for not only patients who are at risk for atherosclerotic cardiovascular disease (ASCVD) but also the general public. In keeping with this evidence-based recommendation, the \"2018 Guideline on the Management of Blood Cholesterol\" stresses that a healthy diet should include adequate intake of these essentials:   Vegetables, fruits, and whole grains   Legumes and nuts   Low-fat dairy products   Low-fat poultry (without the skin)   Fish and seafood   Nontropical vegetable oils     The recent guidelines do provide room for cultural food preferences in a healthy diet, but in general, all patients should limit their intake of saturated and avoid all trans fats, sweets, sugar-sweetened beverages, and red meats.  Please maintain adequate hydration of at least 2 Liters of water per day, and improve nutrition by decreasing portion sizes, avoiding fried foods, fast foods, inappropriate snacking and overly processed and packaged items with 'added sugars' (whether in drinks or foods), and observing nutritional facts information on any items that are packaged to reduce intake of saturated fats and " AVOID trans fats as mentioned above. Again, consume whole foods such as vegetables, higher lean protein intake, and using healthier lifestyle plans such as the Mediterranean diet with healthier fats such as those from seeds, nuts, and using organic extra virgin olive oil or avocado oil in lieu of processed vegetable oils or margarine.    Physical Activity   Recommended DAILY exercise for at least 150 minutes of moderate exercise weekly!  In addition to a healthy diet, all patients should include regular physical activity in their weekly routines, at moderate to vigorous intensity. Any activity is better than nothing, so if your patients can't meet the recommendation of vigorous activity, moderate-intensity activity can still help them reduce their risk of ASCVD.     Below are the American Heart Association's recommendations for physical activity per week (preferably spread throughout the week):     For Overall Cardiovascular Health and Lowering Cholesterol   At least 150 minutes of moderate-intensity physical activity (for example, 30 minutes, 5 days a week), or   At least 75 minutes of vigorous-intensity physical activity (for example, 25 minutes, 3 days a week); or   A combination of moderate- and vigorous-intensity aerobic activity, and   At least 2 days of moderate- to high-intensity muscle-strengthening activities (such as resistance weight training) for additional health benefits    If you have thoughts of harming yourself or are otherwise in psychological crisis, do not hesitate to contact your County Crisis hotline, or 911 or go to the nearest emergency room.  Adult Crisis Numbers  Suicide Prevention Hotline - Dial 9-8-8  [facility]: [phone] or [phone]  [facility]: [phone]  [facility]: [phone]  [facility]: [phone]  [place]: [phone] or [phone]  [facility]: [phone]  [facility]: [phone] or [facility]:  [phone]  [facility]: [phone] (daytime).       [phone] (after hours, weekends, holidays)     Child/Adolescent Crisis Numbers   [facility]: [phone]   [facility]: [phone]   [place]: [phone]   [place]: [phone]  Please note: Some [facility] do not have a separate number for Child/Adolescent specific crisis. If your county is not listed under Child/Adolescent, please call the adult number for your county     National Talk to Text Line   All [facility] - [phone]    National Suicide Prevention Hotline: 1-894.210.6400 or call 467.
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
## 2025-06-05 NOTE — BH TREATMENT PLAN
TREATMENT PLAN        [facility]    Name and Date of Birth:  Tasha Toth 30 y.o. 1995  Date of Treatment Plan: June 5, 2025  Diagnosis/Diagnoses:    1. ANGELLA (generalized anxiety disorder)    2. Bipolar 2 disorder, major depressive episode (rule out substance induced mood disorder)    3. Attention deficit hyperactivity disorder (ADHD), unspecified ADHD type by history    4. Insomnia, unspecified type    5. Alcohol use    6. Tobacco abuse    7. History of psychological trauma    8. Cannabis use disorder, severe, in early remission (HCC)    9. Polysubstance abuse (HCC)        Strengths/Personal Resources for Self-Care: supportive friends, ability to listen, ability to reason, ability to communicate needs, ability to negotiate basic needs, willingness to work on problems, ability to understand psychiatric illness, ability to adapt to life changes, independence, motivation for treatment    Area/Areas of need: anxiety, depression, mood instability, substance abuse, unstable mood    Long Term Goal: improve anxiety, improve depression, improve mood, improve sleep, improve impulse control, Feel better about self, Feel calmer, Feel happier, Feel more positive about the future, Free of suicidal thoughts, and No self abusive behavior  Target Date: 6 months - December 5, 2025  Person/Persons responsible for completion of goal: Tasha and Bartolo Patel MD     Short Term Objective (s) - How will we reach this goal?:   Take medications as prescribed  Attend psychiatry appointments regularly  Get blood work drawn  Continue psychotherapy regularly  Practice coping skills  Try sleep hygiene techniques  Avoid alcohol   Avoid drugs   Take walks regularly  Try breathing exercises  Try relaxation techniques  Target Date: 6 months - December 5, 2025  Person/Persons Responsible for Completion of Goal: Tasha     Progress Towards Goals: Continuing treatment    Treatment Modality:  medication management every 1-3 months as needed, continue psychotherapy (if patient is currently involved in therapy), and follow up with PCP regularly  Review due 180 days from date of this plan: December 2, 2025   Expected length of service: Ongoing treatment    My physician and I have developed this plan together, and I agree to work on the goals and objectives. I understand the treatment goals that were developed for my treatment.    The treatment plan was created between Bartolo Patel MD and Tasha Toth on 06/05/25 at 1:16 PM but not signed at the time of the visit due to COVID social distancing. The plan was reviewed, and verbal consent was given.

## 2025-08-13 ENCOUNTER — TELEPHONE (OUTPATIENT)
Dept: PSYCHIATRY | Facility: CLINIC | Age: 30
End: 2025-08-13